# Patient Record
Sex: FEMALE | Race: WHITE | NOT HISPANIC OR LATINO | Employment: OTHER | URBAN - METROPOLITAN AREA
[De-identification: names, ages, dates, MRNs, and addresses within clinical notes are randomized per-mention and may not be internally consistent; named-entity substitution may affect disease eponyms.]

---

## 2024-03-25 ENCOUNTER — ANESTHESIA (OUTPATIENT)
Dept: SURGERY | Facility: MEDICAL CENTER | Age: 74
End: 2024-03-25
Payer: MEDICARE

## 2024-03-25 ENCOUNTER — HOSPITAL ENCOUNTER (INPATIENT)
Facility: MEDICAL CENTER | Age: 74
DRG: 746 | End: 2024-03-25
Attending: EMERGENCY MEDICINE | Admitting: SURGERY
Payer: MEDICARE

## 2024-03-25 ENCOUNTER — ANESTHESIA EVENT (OUTPATIENT)
Dept: SURGERY | Facility: MEDICAL CENTER | Age: 74
End: 2024-03-25
Payer: MEDICARE

## 2024-03-25 ENCOUNTER — HOSPITAL ENCOUNTER (OUTPATIENT)
Dept: RADIOLOGY | Facility: MEDICAL CENTER | Age: 74
End: 2024-03-25

## 2024-03-25 DIAGNOSIS — N81.10 VAGINAL PROLAPSE: ICD-10-CM

## 2024-03-25 DIAGNOSIS — E87.5 HYPERKALEMIA: ICD-10-CM

## 2024-03-25 DIAGNOSIS — K63.4 PROLAPSE OF INTESTINE: ICD-10-CM

## 2024-03-25 DIAGNOSIS — R73.9 HYPERGLYCEMIA: ICD-10-CM

## 2024-03-25 PROBLEM — E11.9 TYPE 2 DIABETES MELLITUS (HCC): Status: ACTIVE | Noted: 2024-03-25

## 2024-03-25 PROBLEM — K45.8: Status: ACTIVE | Noted: 2024-03-25

## 2024-03-25 PROBLEM — D72.829 LEUKOCYTOSIS: Status: ACTIVE | Noted: 2024-03-25

## 2024-03-25 PROBLEM — R11.2 PONV (POSTOPERATIVE NAUSEA AND VOMITING): Status: ACTIVE | Noted: 2024-03-25

## 2024-03-25 PROBLEM — I10 HYPERTENSION: Status: ACTIVE | Noted: 2024-03-25

## 2024-03-25 PROBLEM — Z98.890 PONV (POSTOPERATIVE NAUSEA AND VOMITING): Status: ACTIVE | Noted: 2024-03-25

## 2024-03-25 LAB
ABO + RH BLD: NORMAL
ABO GROUP BLD: NORMAL
ALBUMIN SERPL BCP-MCNC: 3.4 G/DL (ref 3.2–4.9)
ALBUMIN/GLOB SERPL: 1.3 G/DL
ALP SERPL-CCNC: 89 U/L (ref 30–99)
ALT SERPL-CCNC: 14 U/L (ref 2–50)
ANION GAP SERPL CALC-SCNC: 11 MMOL/L (ref 7–16)
ANION GAP SERPL CALC-SCNC: 12 MMOL/L (ref 7–16)
ANION GAP SERPL CALC-SCNC: 9 MMOL/L (ref 7–16)
AST SERPL-CCNC: 9 U/L (ref 12–45)
BASOPHILS # BLD AUTO: 0.2 % (ref 0–1.8)
BASOPHILS # BLD: 0.04 K/UL (ref 0–0.12)
BILIRUB SERPL-MCNC: 0.2 MG/DL (ref 0.1–1.5)
BLD GP AB SCN SERPL QL: NORMAL
BUN SERPL-MCNC: 29 MG/DL (ref 8–22)
BUN SERPL-MCNC: 30 MG/DL (ref 8–22)
BUN SERPL-MCNC: 30 MG/DL (ref 8–22)
CALCIUM ALBUM COR SERPL-MCNC: 8.9 MG/DL (ref 8.5–10.5)
CALCIUM SERPL-MCNC: 8.4 MG/DL (ref 8.5–10.5)
CALCIUM SERPL-MCNC: 8.5 MG/DL (ref 8.5–10.5)
CALCIUM SERPL-MCNC: 8.5 MG/DL (ref 8.5–10.5)
CHLORIDE SERPL-SCNC: 106 MMOL/L (ref 96–112)
CHLORIDE SERPL-SCNC: 109 MMOL/L (ref 96–112)
CHLORIDE SERPL-SCNC: 114 MMOL/L (ref 96–112)
CO2 SERPL-SCNC: 17 MMOL/L (ref 20–33)
CO2 SERPL-SCNC: 19 MMOL/L (ref 20–33)
CO2 SERPL-SCNC: 20 MMOL/L (ref 20–33)
CREAT SERPL-MCNC: 0.97 MG/DL (ref 0.5–1.4)
CREAT SERPL-MCNC: 1.03 MG/DL (ref 0.5–1.4)
CREAT SERPL-MCNC: 1.04 MG/DL (ref 0.5–1.4)
EKG IMPRESSION: NORMAL
EOSINOPHIL # BLD AUTO: 0 K/UL (ref 0–0.51)
EOSINOPHIL NFR BLD: 0 % (ref 0–6.9)
ERYTHROCYTE [DISTWIDTH] IN BLOOD BY AUTOMATED COUNT: 43.4 FL (ref 35.9–50)
GFR SERPLBLD CREATININE-BSD FMLA CKD-EPI: 56 ML/MIN/1.73 M 2
GFR SERPLBLD CREATININE-BSD FMLA CKD-EPI: 57 ML/MIN/1.73 M 2
GFR SERPLBLD CREATININE-BSD FMLA CKD-EPI: 61 ML/MIN/1.73 M 2
GLOBULIN SER CALC-MCNC: 2.7 G/DL (ref 1.9–3.5)
GLUCOSE BLD STRIP.AUTO-MCNC: 161 MG/DL (ref 65–99)
GLUCOSE BLD STRIP.AUTO-MCNC: 182 MG/DL (ref 65–99)
GLUCOSE BLD STRIP.AUTO-MCNC: 230 MG/DL (ref 65–99)
GLUCOSE SERPL-MCNC: 209 MG/DL (ref 65–99)
GLUCOSE SERPL-MCNC: 250 MG/DL (ref 65–99)
GLUCOSE SERPL-MCNC: 314 MG/DL (ref 65–99)
HCT VFR BLD AUTO: 43.7 % (ref 37–47)
HGB BLD-MCNC: 14.3 G/DL (ref 12–16)
IMM GRANULOCYTES # BLD AUTO: 0.1 K/UL (ref 0–0.11)
IMM GRANULOCYTES NFR BLD AUTO: 0.5 % (ref 0–0.9)
LACTATE SERPL-SCNC: 1.6 MMOL/L (ref 0.5–2)
LYMPHOCYTES # BLD AUTO: 0.64 K/UL (ref 1–4.8)
LYMPHOCYTES NFR BLD: 3.5 % (ref 22–41)
MCH RBC QN AUTO: 30.7 PG (ref 27–33)
MCHC RBC AUTO-ENTMCNC: 32.7 G/DL (ref 32.2–35.5)
MCV RBC AUTO: 93.8 FL (ref 81.4–97.8)
MONOCYTES # BLD AUTO: 0.6 K/UL (ref 0–0.85)
MONOCYTES NFR BLD AUTO: 3.3 % (ref 0–13.4)
NEUTROPHILS # BLD AUTO: 16.85 K/UL (ref 1.82–7.42)
NEUTROPHILS NFR BLD: 92.5 % (ref 44–72)
NRBC # BLD AUTO: 0 K/UL
NRBC BLD-RTO: 0 /100 WBC (ref 0–0.2)
PATHOLOGY CONSULT NOTE: NORMAL
PLATELET # BLD AUTO: 370 K/UL (ref 164–446)
PMV BLD AUTO: 10.6 FL (ref 9–12.9)
POTASSIUM SERPL-SCNC: 5.2 MMOL/L (ref 3.6–5.5)
POTASSIUM SERPL-SCNC: 5.9 MMOL/L (ref 3.6–5.5)
POTASSIUM SERPL-SCNC: 5.9 MMOL/L (ref 3.6–5.5)
PROT SERPL-MCNC: 6.1 G/DL (ref 6–8.2)
RBC # BLD AUTO: 4.66 M/UL (ref 4.2–5.4)
RH BLD: NORMAL
SODIUM SERPL-SCNC: 138 MMOL/L (ref 135–145)
SODIUM SERPL-SCNC: 139 MMOL/L (ref 135–145)
SODIUM SERPL-SCNC: 140 MMOL/L (ref 135–145)
WBC # BLD AUTO: 18.2 K/UL (ref 4.8–10.8)

## 2024-03-25 PROCEDURE — 80048 BASIC METABOLIC PNL TOTAL CA: CPT

## 2024-03-25 PROCEDURE — 0UQG0ZZ REPAIR VAGINA, OPEN APPROACH: ICD-10-PCS | Performed by: SPECIALIST

## 2024-03-25 PROCEDURE — 700105 HCHG RX REV CODE 258: Performed by: EMERGENCY MEDICINE

## 2024-03-25 PROCEDURE — 36415 COLL VENOUS BLD VENIPUNCTURE: CPT

## 2024-03-25 PROCEDURE — 93005 ELECTROCARDIOGRAM TRACING: CPT | Performed by: EMERGENCY MEDICINE

## 2024-03-25 PROCEDURE — 160048 HCHG OR STATISTICAL LEVEL 1-5: Performed by: SURGERY

## 2024-03-25 PROCEDURE — 160031 HCHG SURGERY MINUTES - 1ST 30 MINS LEVEL 5: Performed by: SURGERY

## 2024-03-25 PROCEDURE — 700105 HCHG RX REV CODE 258: Performed by: SURGERY

## 2024-03-25 PROCEDURE — 160036 HCHG PACU - EA ADDL 30 MINS PHASE I: Performed by: SURGERY

## 2024-03-25 PROCEDURE — 700111 HCHG RX REV CODE 636 W/ 250 OVERRIDE (IP): Performed by: INTERNAL MEDICINE

## 2024-03-25 PROCEDURE — 83605 ASSAY OF LACTIC ACID: CPT

## 2024-03-25 PROCEDURE — 700105 HCHG RX REV CODE 258: Performed by: INTERNAL MEDICINE

## 2024-03-25 PROCEDURE — 96372 THER/PROPH/DIAG INJ SC/IM: CPT

## 2024-03-25 PROCEDURE — 160002 HCHG RECOVERY MINUTES (STAT): Performed by: SURGERY

## 2024-03-25 PROCEDURE — 160009 HCHG ANES TIME/MIN: Performed by: SURGERY

## 2024-03-25 PROCEDURE — 99222 1ST HOSP IP/OBS MODERATE 55: CPT | Mod: 57 | Performed by: SURGERY

## 2024-03-25 PROCEDURE — 86901 BLOOD TYPING SEROLOGIC RH(D): CPT

## 2024-03-25 PROCEDURE — 86850 RBC ANTIBODY SCREEN: CPT

## 2024-03-25 PROCEDURE — 82962 GLUCOSE BLOOD TEST: CPT | Mod: 91

## 2024-03-25 PROCEDURE — 88307 TISSUE EXAM BY PATHOLOGIST: CPT

## 2024-03-25 PROCEDURE — 80053 COMPREHEN METABOLIC PANEL: CPT

## 2024-03-25 PROCEDURE — 0DT80ZZ RESECTION OF SMALL INTESTINE, OPEN APPROACH: ICD-10-PCS | Performed by: SURGERY

## 2024-03-25 PROCEDURE — 86900 BLOOD TYPING SEROLOGIC ABO: CPT

## 2024-03-25 PROCEDURE — 85025 COMPLETE CBC W/AUTO DIFF WBC: CPT

## 2024-03-25 PROCEDURE — 700102 HCHG RX REV CODE 250 W/ 637 OVERRIDE(OP): Performed by: EMERGENCY MEDICINE

## 2024-03-25 PROCEDURE — 770022 HCHG ROOM/CARE - ICU (200)

## 2024-03-25 PROCEDURE — 700111 HCHG RX REV CODE 636 W/ 250 OVERRIDE (IP): Mod: JZ,JG | Performed by: EMERGENCY MEDICINE

## 2024-03-25 PROCEDURE — 700111 HCHG RX REV CODE 636 W/ 250 OVERRIDE (IP): Mod: JZ,JG | Performed by: INTERNAL MEDICINE

## 2024-03-25 PROCEDURE — 160035 HCHG PACU - 1ST 60 MINS PHASE I: Performed by: SURGERY

## 2024-03-25 PROCEDURE — 99223 1ST HOSP IP/OBS HIGH 75: CPT | Mod: AI | Performed by: INTERNAL MEDICINE

## 2024-03-25 PROCEDURE — 96375 TX/PRO/DX INJ NEW DRUG ADDON: CPT

## 2024-03-25 PROCEDURE — 700111 HCHG RX REV CODE 636 W/ 250 OVERRIDE (IP): Performed by: ANESTHESIOLOGY

## 2024-03-25 PROCEDURE — 160042 HCHG SURGERY MINUTES - EA ADDL 1 MIN LEVEL 5: Performed by: SURGERY

## 2024-03-25 PROCEDURE — 96374 THER/PROPH/DIAG INJ IV PUSH: CPT

## 2024-03-25 PROCEDURE — 700101 HCHG RX REV CODE 250: Performed by: ANESTHESIOLOGY

## 2024-03-25 PROCEDURE — 700111 HCHG RX REV CODE 636 W/ 250 OVERRIDE (IP): Mod: JZ | Performed by: SURGERY

## 2024-03-25 PROCEDURE — 99291 CRITICAL CARE FIRST HOUR: CPT

## 2024-03-25 PROCEDURE — 700101 HCHG RX REV CODE 250: Performed by: INTERNAL MEDICINE

## 2024-03-25 PROCEDURE — C1765 ADHESION BARRIER: HCPCS | Performed by: SURGERY

## 2024-03-25 RX ORDER — POLYETHYLENE GLYCOL 3350 17 G/17G
1 POWDER, FOR SOLUTION ORAL
Status: DISCONTINUED | OUTPATIENT
Start: 2024-03-25 | End: 2024-04-02 | Stop reason: HOSPADM

## 2024-03-25 RX ORDER — HYDRALAZINE HYDROCHLORIDE 20 MG/ML
20 INJECTION INTRAMUSCULAR; INTRAVENOUS EVERY 6 HOURS PRN
Status: DISCONTINUED | OUTPATIENT
Start: 2024-03-25 | End: 2024-03-26

## 2024-03-25 RX ORDER — AMLODIPINE BESYLATE 10 MG/1
10 TABLET ORAL DAILY
COMMUNITY

## 2024-03-25 RX ORDER — LIDOCAINE HYDROCHLORIDE 20 MG/ML
INJECTION, SOLUTION EPIDURAL; INFILTRATION; INTRACAUDAL; PERINEURAL PRN
Status: DISCONTINUED | OUTPATIENT
Start: 2024-03-25 | End: 2024-03-25 | Stop reason: SURG

## 2024-03-25 RX ORDER — PHENYLEPHRINE HCL IN 0.9% NACL 0.5 MG/5ML
SYRINGE (ML) INTRAVENOUS PRN
Status: DISCONTINUED | OUTPATIENT
Start: 2024-03-25 | End: 2024-03-25 | Stop reason: SURG

## 2024-03-25 RX ORDER — DIPHENHYDRAMINE HYDROCHLORIDE 50 MG/ML
12.5 INJECTION INTRAMUSCULAR; INTRAVENOUS
Status: DISCONTINUED | OUTPATIENT
Start: 2024-03-25 | End: 2024-03-25 | Stop reason: HOSPADM

## 2024-03-25 RX ORDER — ONDANSETRON 2 MG/ML
4 INJECTION INTRAMUSCULAR; INTRAVENOUS ONCE
Status: ACTIVE | OUTPATIENT
Start: 2024-03-25 | End: 2024-03-26

## 2024-03-25 RX ORDER — OXYCODONE HYDROCHLORIDE 5 MG/1
2.5 TABLET ORAL
Status: DISCONTINUED | OUTPATIENT
Start: 2024-03-25 | End: 2024-04-02

## 2024-03-25 RX ORDER — HYDROMORPHONE HYDROCHLORIDE 1 MG/ML
0.2 INJECTION, SOLUTION INTRAMUSCULAR; INTRAVENOUS; SUBCUTANEOUS
Status: DISCONTINUED | OUTPATIENT
Start: 2024-03-25 | End: 2024-03-25 | Stop reason: HOSPADM

## 2024-03-25 RX ORDER — MIDAZOLAM HYDROCHLORIDE 1 MG/ML
INJECTION INTRAMUSCULAR; INTRAVENOUS PRN
Status: DISCONTINUED | OUTPATIENT
Start: 2024-03-25 | End: 2024-03-25 | Stop reason: SURG

## 2024-03-25 RX ORDER — ROCURONIUM BROMIDE 10 MG/ML
INJECTION, SOLUTION INTRAVENOUS PRN
Status: DISCONTINUED | OUTPATIENT
Start: 2024-03-25 | End: 2024-03-25 | Stop reason: SURG

## 2024-03-25 RX ORDER — ASCORBIC ACID 500 MG
500 TABLET ORAL DAILY
COMMUNITY

## 2024-03-25 RX ORDER — OXYCODONE HCL 5 MG/5 ML
5 SOLUTION, ORAL ORAL
Status: DISCONTINUED | OUTPATIENT
Start: 2024-03-25 | End: 2024-03-25 | Stop reason: HOSPADM

## 2024-03-25 RX ORDER — EPHEDRINE SULFATE 50 MG/ML
5 INJECTION, SOLUTION INTRAVENOUS
Status: DISCONTINUED | OUTPATIENT
Start: 2024-03-25 | End: 2024-03-25 | Stop reason: HOSPADM

## 2024-03-25 RX ORDER — CALCIUM CHLORIDE 100 MG/ML
INJECTION INTRAVENOUS; INTRAVENTRICULAR PRN
Status: DISCONTINUED | OUTPATIENT
Start: 2024-03-25 | End: 2024-03-25 | Stop reason: SURG

## 2024-03-25 RX ORDER — HYDRALAZINE HYDROCHLORIDE 20 MG/ML
5 INJECTION INTRAMUSCULAR; INTRAVENOUS
Status: DISCONTINUED | OUTPATIENT
Start: 2024-03-25 | End: 2024-03-25 | Stop reason: HOSPADM

## 2024-03-25 RX ORDER — HALOPERIDOL 5 MG/ML
1 INJECTION INTRAMUSCULAR
Status: DISCONTINUED | OUTPATIENT
Start: 2024-03-25 | End: 2024-03-25 | Stop reason: HOSPADM

## 2024-03-25 RX ORDER — ENOXAPARIN SODIUM 100 MG/ML
40 INJECTION SUBCUTANEOUS DAILY
Status: DISCONTINUED | OUTPATIENT
Start: 2024-03-26 | End: 2024-04-02 | Stop reason: HOSPADM

## 2024-03-25 RX ORDER — ONDANSETRON 2 MG/ML
4 INJECTION INTRAMUSCULAR; INTRAVENOUS
Status: DISCONTINUED | OUTPATIENT
Start: 2024-03-25 | End: 2024-03-25 | Stop reason: HOSPADM

## 2024-03-25 RX ORDER — LOVASTATIN 40 MG/1
40 TABLET ORAL DAILY
COMMUNITY

## 2024-03-25 RX ORDER — AMOXICILLIN 250 MG
2 CAPSULE ORAL EVERY EVENING
Status: DISCONTINUED | OUTPATIENT
Start: 2024-03-25 | End: 2024-04-02 | Stop reason: HOSPADM

## 2024-03-25 RX ORDER — OXYCODONE HYDROCHLORIDE 5 MG/1
5 TABLET ORAL
Status: DISCONTINUED | OUTPATIENT
Start: 2024-03-25 | End: 2024-04-02

## 2024-03-25 RX ORDER — IBUPROFEN 200 MG
400 TABLET ORAL NIGHTLY
COMMUNITY

## 2024-03-25 RX ORDER — HYDRALAZINE HYDROCHLORIDE 20 MG/ML
10 INJECTION INTRAMUSCULAR; INTRAVENOUS ONCE
Status: ACTIVE | OUTPATIENT
Start: 2024-03-25 | End: 2024-03-26

## 2024-03-25 RX ORDER — HYDROMORPHONE HYDROCHLORIDE 2 MG/ML
INJECTION, SOLUTION INTRAMUSCULAR; INTRAVENOUS; SUBCUTANEOUS PRN
Status: DISCONTINUED | OUTPATIENT
Start: 2024-03-25 | End: 2024-03-25 | Stop reason: SURG

## 2024-03-25 RX ORDER — ATENOLOL 25 MG/1
25 TABLET ORAL DAILY
COMMUNITY

## 2024-03-25 RX ORDER — BENAZEPRIL HYDROCHLORIDE 40 MG/1
40 TABLET ORAL DAILY
COMMUNITY

## 2024-03-25 RX ORDER — HYDROMORPHONE HYDROCHLORIDE 1 MG/ML
0.4 INJECTION, SOLUTION INTRAMUSCULAR; INTRAVENOUS; SUBCUTANEOUS
Status: DISCONTINUED | OUTPATIENT
Start: 2024-03-25 | End: 2024-03-25 | Stop reason: HOSPADM

## 2024-03-25 RX ORDER — LABETALOL HYDROCHLORIDE 5 MG/ML
5 INJECTION, SOLUTION INTRAVENOUS
Status: DISCONTINUED | OUTPATIENT
Start: 2024-03-25 | End: 2024-03-25 | Stop reason: HOSPADM

## 2024-03-25 RX ORDER — OXYCODONE HCL 5 MG/5 ML
10 SOLUTION, ORAL ORAL
Status: DISCONTINUED | OUTPATIENT
Start: 2024-03-25 | End: 2024-03-25 | Stop reason: HOSPADM

## 2024-03-25 RX ORDER — HYDROMORPHONE HYDROCHLORIDE 1 MG/ML
0.1 INJECTION, SOLUTION INTRAMUSCULAR; INTRAVENOUS; SUBCUTANEOUS
Status: DISCONTINUED | OUTPATIENT
Start: 2024-03-25 | End: 2024-03-25 | Stop reason: HOSPADM

## 2024-03-25 RX ORDER — HYDROMORPHONE HYDROCHLORIDE 1 MG/ML
0.5 INJECTION, SOLUTION INTRAMUSCULAR; INTRAVENOUS; SUBCUTANEOUS ONCE
Status: ACTIVE | OUTPATIENT
Start: 2024-03-25 | End: 2024-03-26

## 2024-03-25 RX ORDER — METRONIDAZOLE 500 MG/100ML
500 INJECTION, SOLUTION INTRAVENOUS EVERY 12 HOURS
Status: DISCONTINUED | OUTPATIENT
Start: 2024-03-25 | End: 2024-03-25

## 2024-03-25 RX ORDER — MORPHINE SULFATE 4 MG/ML
2 INJECTION INTRAVENOUS
Status: DISCONTINUED | OUTPATIENT
Start: 2024-03-25 | End: 2024-03-29

## 2024-03-25 RX ORDER — ONDANSETRON 4 MG/1
4 TABLET, ORALLY DISINTEGRATING ORAL EVERY 4 HOURS PRN
Status: DISCONTINUED | OUTPATIENT
Start: 2024-03-25 | End: 2024-04-02 | Stop reason: HOSPADM

## 2024-03-25 RX ORDER — ONDANSETRON 2 MG/ML
INJECTION INTRAMUSCULAR; INTRAVENOUS PRN
Status: DISCONTINUED | OUTPATIENT
Start: 2024-03-25 | End: 2024-03-25 | Stop reason: SURG

## 2024-03-25 RX ORDER — SODIUM CHLORIDE 9 MG/ML
1000 INJECTION, SOLUTION INTRAVENOUS ONCE
Status: COMPLETED | OUTPATIENT
Start: 2024-03-25 | End: 2024-03-25

## 2024-03-25 RX ORDER — EPHEDRINE SULFATE 50 MG/ML
INJECTION, SOLUTION INTRAVENOUS PRN
Status: DISCONTINUED | OUTPATIENT
Start: 2024-03-25 | End: 2024-03-25 | Stop reason: SURG

## 2024-03-25 RX ORDER — SODIUM CHLORIDE, SODIUM LACTATE, POTASSIUM CHLORIDE, CALCIUM CHLORIDE 600; 310; 30; 20 MG/100ML; MG/100ML; MG/100ML; MG/100ML
INJECTION, SOLUTION INTRAVENOUS CONTINUOUS
Status: DISCONTINUED | OUTPATIENT
Start: 2024-03-25 | End: 2024-03-25 | Stop reason: HOSPADM

## 2024-03-25 RX ORDER — LABETALOL HYDROCHLORIDE 5 MG/ML
10 INJECTION, SOLUTION INTRAVENOUS EVERY 4 HOURS PRN
Status: DISCONTINUED | OUTPATIENT
Start: 2024-03-25 | End: 2024-03-26

## 2024-03-25 RX ORDER — SODIUM CHLORIDE 9 MG/ML
INJECTION, SOLUTION INTRAVENOUS CONTINUOUS
Status: DISCONTINUED | OUTPATIENT
Start: 2024-03-25 | End: 2024-03-29

## 2024-03-25 RX ORDER — MORPHINE SULFATE 4 MG/ML
4 INJECTION INTRAVENOUS
Status: COMPLETED | OUTPATIENT
Start: 2024-03-25 | End: 2024-03-25

## 2024-03-25 RX ORDER — ATENOLOL 25 MG/1
25 TABLET ORAL
Status: DISCONTINUED | OUTPATIENT
Start: 2024-03-25 | End: 2024-03-25

## 2024-03-25 RX ORDER — ONDANSETRON 2 MG/ML
4 INJECTION INTRAMUSCULAR; INTRAVENOUS EVERY 4 HOURS PRN
Status: DISCONTINUED | OUTPATIENT
Start: 2024-03-25 | End: 2024-04-02 | Stop reason: HOSPADM

## 2024-03-25 RX ADMIN — HYDROMORPHONE HYDROCHLORIDE 0.6 MG: 2 INJECTION INTRAMUSCULAR; INTRAVENOUS; SUBCUTANEOUS at 09:23

## 2024-03-25 RX ADMIN — MORPHINE SULFATE 2 MG: 4 INJECTION, SOLUTION INTRAMUSCULAR; INTRAVENOUS at 18:16

## 2024-03-25 RX ADMIN — CALCIUM CHLORIDE 300 MG: 100 INJECTION, SOLUTION INTRAVENOUS; INTRAVENTRICULAR at 09:00

## 2024-03-25 RX ADMIN — SODIUM CHLORIDE 1000 ML: 9 INJECTION, SOLUTION INTRAVENOUS at 03:25

## 2024-03-25 RX ADMIN — Medication 200 MCG: at 07:58

## 2024-03-25 RX ADMIN — Medication 25 MG: at 08:22

## 2024-03-25 RX ADMIN — CEFTRIAXONE SODIUM 1000 MG: 10 INJECTION, POWDER, FOR SOLUTION INTRAVENOUS at 07:56

## 2024-03-25 RX ADMIN — FENTANYL CITRATE 50 MCG: 50 INJECTION, SOLUTION INTRAMUSCULAR; INTRAVENOUS at 07:51

## 2024-03-25 RX ADMIN — SUGAMMADEX 100 MG: 100 INJECTION, SOLUTION INTRAVENOUS at 09:25

## 2024-03-25 RX ADMIN — MIDAZOLAM HYDROCHLORIDE 1 MG: 1 INJECTION, SOLUTION INTRAMUSCULAR; INTRAVENOUS at 07:51

## 2024-03-25 RX ADMIN — ONDANSETRON 4 MG: 2 INJECTION INTRAMUSCULAR; INTRAVENOUS at 05:13

## 2024-03-25 RX ADMIN — Medication 100 MCG: at 07:56

## 2024-03-25 RX ADMIN — FENTANYL CITRATE 50 MCG: 50 INJECTION, SOLUTION INTRAMUSCULAR; INTRAVENOUS at 08:22

## 2024-03-25 RX ADMIN — CALCIUM CHLORIDE 400 MG: 100 INJECTION, SOLUTION INTRAVENOUS; INTRAVENTRICULAR at 09:38

## 2024-03-25 RX ADMIN — INSULIN HUMAN 2 UNITS: 100 INJECTION, SOLUTION PARENTERAL at 12:12

## 2024-03-25 RX ADMIN — ROCURONIUM BROMIDE 90 MG: 50 INJECTION, SOLUTION INTRAVENOUS at 07:51

## 2024-03-25 RX ADMIN — Medication 100 MCG: at 08:40

## 2024-03-25 RX ADMIN — SODIUM CHLORIDE: 9 INJECTION, SOLUTION INTRAVENOUS at 08:38

## 2024-03-25 RX ADMIN — INSULIN HUMAN 2 UNITS: 100 INJECTION, SOLUTION PARENTERAL at 17:54

## 2024-03-25 RX ADMIN — SODIUM CHLORIDE: 9 INJECTION, SOLUTION INTRAVENOUS at 07:46

## 2024-03-25 RX ADMIN — SODIUM CHLORIDE: 9 INJECTION, SOLUTION INTRAVENOUS at 23:15

## 2024-03-25 RX ADMIN — EPHEDRINE SULFATE 5 MG: 50 INJECTION, SOLUTION INTRAVENOUS at 08:14

## 2024-03-25 RX ADMIN — INSULIN HUMAN 5 UNITS: 100 INJECTION, SOLUTION PARENTERAL at 04:07

## 2024-03-25 RX ADMIN — EPHEDRINE SULFATE 5 MG: 50 INJECTION, SOLUTION INTRAVENOUS at 08:06

## 2024-03-25 RX ADMIN — MICAFUNGIN SODIUM 100 MG: 100 INJECTION, POWDER, LYOPHILIZED, FOR SOLUTION INTRAVENOUS at 13:27

## 2024-03-25 RX ADMIN — LIDOCAINE HYDROCHLORIDE 100 MG: 20 INJECTION, SOLUTION EPIDURAL; INFILTRATION; INTRACAUDAL at 07:51

## 2024-03-25 RX ADMIN — FENTANYL CITRATE 25 MCG: 50 INJECTION, SOLUTION INTRAMUSCULAR; INTRAVENOUS at 09:33

## 2024-03-25 RX ADMIN — PIPERACILLIN AND TAZOBACTAM 4.5 G: 4; .5 INJECTION, POWDER, FOR SOLUTION INTRAVENOUS at 21:16

## 2024-03-25 RX ADMIN — Medication 100 MCG: at 08:43

## 2024-03-25 RX ADMIN — PIPERACILLIN AND TAZOBACTAM 4.5 G: 4; .5 INJECTION, POWDER, FOR SOLUTION INTRAVENOUS at 15:11

## 2024-03-25 RX ADMIN — SODIUM CHLORIDE: 9 INJECTION, SOLUTION INTRAVENOUS at 12:11

## 2024-03-25 RX ADMIN — CALCIUM CHLORIDE 300 MG: 100 INJECTION, SOLUTION INTRAVENOUS; INTRAVENTRICULAR at 07:56

## 2024-03-25 RX ADMIN — Medication 100 MCG: at 08:35

## 2024-03-25 RX ADMIN — EPHEDRINE SULFATE 10 MG: 50 INJECTION, SOLUTION INTRAVENOUS at 08:38

## 2024-03-25 RX ADMIN — Medication 100 MCG: at 08:38

## 2024-03-25 RX ADMIN — SUGAMMADEX 200 MG: 100 INJECTION, SOLUTION INTRAVENOUS at 09:23

## 2024-03-25 RX ADMIN — MORPHINE SULFATE 4 MG: 4 INJECTION, SOLUTION INTRAMUSCULAR; INTRAVENOUS at 05:14

## 2024-03-25 RX ADMIN — PROPOFOL 150 MG: 10 INJECTION, EMULSION INTRAVENOUS at 07:51

## 2024-03-25 RX ADMIN — FENTANYL CITRATE 25 MCG: 50 INJECTION, SOLUTION INTRAMUSCULAR; INTRAVENOUS at 09:28

## 2024-03-25 RX ADMIN — MORPHINE SULFATE 2 MG: 4 INJECTION, SOLUTION INTRAMUSCULAR; INTRAVENOUS at 15:16

## 2024-03-25 RX ADMIN — Medication 100 MCG: at 08:45

## 2024-03-25 RX ADMIN — HYDROMORPHONE HYDROCHLORIDE 0.4 MG: 2 INJECTION INTRAMUSCULAR; INTRAVENOUS; SUBCUTANEOUS at 09:30

## 2024-03-25 RX ADMIN — ONDANSETRON 4 MG: 2 INJECTION INTRAMUSCULAR; INTRAVENOUS at 09:20

## 2024-03-25 RX ADMIN — PIPERACILLIN AND TAZOBACTAM 4.5 G: 4; .5 INJECTION, POWDER, FOR SOLUTION INTRAVENOUS at 13:04

## 2024-03-25 ASSESSMENT — PAIN DESCRIPTION - PAIN TYPE
TYPE: ACUTE PAIN
TYPE: ACUTE PAIN
TYPE: SURGICAL PAIN
TYPE: ACUTE PAIN
TYPE: SURGICAL PAIN
TYPE: SURGICAL PAIN
TYPE: ACUTE PAIN

## 2024-03-25 ASSESSMENT — ENCOUNTER SYMPTOMS
SPEECH CHANGE: 0
HALLUCINATIONS: 0
HEADACHES: 0
HEARTBURN: 0
ORTHOPNEA: 0
FEVER: 0
FOCAL WEAKNESS: 0
HEMOPTYSIS: 0
ABDOMINAL PAIN: 1
COUGH: 0
NAUSEA: 1
SPUTUM PRODUCTION: 0
PHOTOPHOBIA: 0
DOUBLE VISION: 0
WEIGHT LOSS: 0
CHILLS: 0
PALPITATIONS: 0
TREMORS: 0
VOMITING: 0
NERVOUS/ANXIOUS: 0
BACK PAIN: 0
FLANK PAIN: 0
BRUISES/BLEEDS EASILY: 0
BLURRED VISION: 0
NECK PAIN: 0
POLYDIPSIA: 0

## 2024-03-25 ASSESSMENT — PATIENT HEALTH QUESTIONNAIRE - PHQ9
1. LITTLE INTEREST OR PLEASURE IN DOING THINGS: NOT AT ALL
SUM OF ALL RESPONSES TO PHQ9 QUESTIONS 1 AND 2: 0
2. FEELING DOWN, DEPRESSED, IRRITABLE, OR HOPELESS: NOT AT ALL

## 2024-03-25 ASSESSMENT — FIBROSIS 4 INDEX: FIB4 SCORE: 0.49

## 2024-03-25 ASSESSMENT — LIFESTYLE VARIABLES: SUBSTANCE_ABUSE: 0

## 2024-03-25 NOTE — ASSESSMENT & PLAN NOTE
WBC 18.2  Likely related to small bowel prolapse.  Will continue empiric antibiotics for possible bacterial translocation

## 2024-03-25 NOTE — OR SURGEON
Immediate Post OP Note    PreOp Diagnosis: Vaginal cuff dehiscence with evisceration of small bowel      PostOp Diagnosis: Same as above      Procedure(s):  LAPAROTOMY, EXPLORATORY, CELIOTOMY  REPAIR, vaginal cuff dehiscence, closure of the vaginal cuff      Surgeon(s):  JULIA Su M.D. Frieda M Hulka, M.D. Peter C Lim, M.D.    Anesthesiologist/Type of Anesthesia:  Anesthesiologist: Veronique Pires M.D./* No anesthesia type entered *    Surgical Staff:  Circulator: Honey Foster R.N.  Relief Circulator: Carolyn Marquez R.N.  Scrub Person: Ryane Murillo Assist: Rosaura Landa D.N.P.    Specimens removed if any:  ID Type Source Tests Collected by Time Destination   A :  Tissue Small intestine PATHOLOGY SPECIMEN Lorrie Harris M.D. 3/25/2024  8:59 AM        Estimated Blood Loss: Minimal    Findings: Complete evisceration of the small bowel through the vaginal canal approximately 3 feet of dusky small intestines noted there appears to be no necrosis of the bowel.  Uterus is absent.  Cannot appreciate any evidence of adnexa.  Patient also has a cystocele.  She has apical prolapse.    Complications: None        3/25/2024 9:00 AM Delio Boss M.D.

## 2024-03-25 NOTE — H&P
"Hospital Medicine History & Physical Note    Date of Service  3/25/2024    Primary Care Physician  Pcp Pt States None        Code Status  Full Code    Chief Complaint  Chief Complaint   Patient presents with    Sent by MD     Pt transfer from [place] for prolapsed bowel. Pt states she went to [facility] [place] this morning around 9 am, she felt like her \"water broke.\" Bowel is protruding out of vagina.        History of Presenting Illness  Katie Hollingsworth is a 74 y.o. female with a past medical history of hysterectomy, type 2 diabetes, hypertension who presented 3/25/2024 as a transfer from a hospital in [facility] with prolapsed bowel.  Patient was taking a shower when she felt like \"water broke\" and to noticed bowels protruding through perineal vagina.  CT was abdomen at outside facility showed large rectal versus vaginal prolapse containing multiple loops of small bowel and mesenteric fat with perineal defect measuring 3.7 x 3.5 cm.  Infrarenal abdominal aortic aneurysm 2.8 cm.  Blood pressure noted to be elevated at 190/90, mild bradycardia 55.  Blood work showed WBC 18.2, potassium 5.9, blood sugar 314.  Patient was given 5 units of insulin subcu for hyperkalemia.  EKG shows sinus rhythm, no peaked T waves.  ERP Dr. Augustine consulted with /OB/GYN.  Surgical plan is pending.    Patient complains of nausea and lower abdominal pain that started after onset of prolapse.  I discussed the plan of care with patient and ERP .    Review of Systems  Review of Systems   Constitutional:  Negative for chills, fever and weight loss.   HENT:  Negative for ear pain, hearing loss and tinnitus.    Eyes:  Negative for blurred vision, double vision and photophobia.   Respiratory:  Negative for cough, hemoptysis and sputum production.    Cardiovascular:  Negative for chest pain, palpitations and orthopnea.   Gastrointestinal:  Positive for abdominal pain and nausea. Negative for heartburn and vomiting.   Genitourinary:  Negative " for dysuria, flank pain, frequency and hematuria.   Musculoskeletal:  Negative for back pain, joint pain and neck pain.   Skin:  Negative for itching and rash.   Neurological:  Negative for tremors, speech change, focal weakness and headaches.   Endo/Heme/Allergies:  Negative for environmental allergies and polydipsia. Does not bruise/bleed easily.   Psychiatric/Behavioral:  Negative for hallucinations and substance abuse. The patient is not nervous/anxious.        Past Medical History   has no past medical history on file.    Surgical History   has no past surgical history on file.     Family History  family history is not on file.   Family history reviewed with patient. There is no family history that is pertinent to the chief complaint.     Social History   reports that she has never smoked. She has never used smokeless tobacco. She reports that she does not drink alcohol and does not use drugs.    Allergies  No Known Allergies    Medications  Prior to Admission Medications   Prescriptions Last Dose Informant Patient Reported? Taking?   AMLODIPINE BESYLATE PO 3/23/2024 at PM Patient Yes Yes   Sig: Take 1 Tablet by mouth every day.   ATENOLOL PO 3/23/2024 at PM Patient Yes Yes   Sig: Take 1 Tablet by mouth every day.   ascorbic acid (VITAMIN C) 500 MG tablet 3/24/2024 at AM Patient Yes Yes   Sig: Take 500 mg by mouth every day.   benazepril (LOTENSIN) 40 MG tablet 3/24/2024 at AM Patient Yes Yes   Sig: Take 40 mg by mouth every day.   ibuprofen (MOTRIN) 200 MG Tab 3/23/2024 at PM Patient Yes Yes   Sig: Take 400 mg by mouth every evening.   lovastatin (MEVACOR) 40 MG tablet 3/24/2024 at AM Patient Yes Yes   Sig: Take 40 mg by mouth every day.   metFORMIN (GLUCOPHAGE) 500 MG Tab 3/24/2024 at AM Patient Yes Yes   Sig: Take 1,000 mg by mouth 2 times a day.      Facility-Administered Medications: None       Physical Exam  Temp:  [36.3 °C (97.4 °F)] 36.3 °C (97.4 °F)  Pulse:  [52-70] 52  Resp:  [12-16] 16  BP:  "(138-145)/(65-67) 145/67  SpO2:  [89 %-97 %] 97 %  Blood Pressure : (!) 145/67   Temperature: 36.3 °C (97.4 °F)   Pulse: (!) 52   Respiration: 16   Pulse Oximetry: 97 %       Physical Exam  Vitals and nursing note reviewed.   Constitutional:       General: She is not in acute distress.     Appearance: Normal appearance.   HENT:      Head: Normocephalic and atraumatic.      Nose: Nose normal.      Mouth/Throat:      Mouth: Mucous membranes are moist.   Eyes:      Extraocular Movements: Extraocular movements intact.      Pupils: Pupils are equal, round, and reactive to light.   Cardiovascular:      Rate and Rhythm: Regular rhythm. Bradycardia present.   Pulmonary:      Effort: Pulmonary effort is normal.      Breath sounds: Normal breath sounds.   Abdominal:      General: Abdomen is flat. There is no distension.      Tenderness: There is no abdominal tenderness.   Genitourinary:     Comments: Vaginal prolapse  Musculoskeletal:         General: No swelling or deformity. Normal range of motion.      Cervical back: Normal range of motion and neck supple.   Skin:     General: Skin is warm and dry.   Neurological:      General: No focal deficit present.      Mental Status: She is alert and oriented to person, place, and time.   Psychiatric:         Mood and Affect: Mood normal.         Behavior: Behavior normal.         Laboratory:  Recent Labs     03/25/24  0307   WBC 18.2*   RBC 4.66   HEMOGLOBIN 14.3   HEMATOCRIT 43.7   MCV 93.8   MCH 30.7   MCHC 32.7   RDW 43.4   PLATELETCT 370   MPV 10.6     Recent Labs     03/25/24  0307   SODIUM 138   POTASSIUM 5.9*   CHLORIDE 106   CO2 20   GLUCOSE 314*   BUN 30*   CREATININE 1.04   CALCIUM 8.4*     Recent Labs     03/25/24  0307   ALTSGPT 14   ASTSGOT 9*   ALKPHOSPHAT 89   TBILIRUBIN 0.2   GLUCOSE 314*         No results for input(s): \"NTPROBNP\" in the last 72 hours.      No results for input(s): \"TROPONINT\" in the last 72 hours.    Imaging:  OUTSIDE IMAGES-CT ABDOMEN /PELVIS "   Final Result              Assessment/Plan:  Justification for Admission Status  I anticipate this patient will require at least two midnights for appropriate medical management, necessitating inpatient admission because of vaginal prolapse    Patient will need a Med/Surg bed on MEDICAL service .  The need is secondary to vaginal prolapse.    * Vaginal prolapse- (present on admission)  Assessment & Plan  OB/GYN consulted, plan is pending  IV morphine as needed for pain  N.p.o.    Hypertension  Assessment & Plan  Uncontrolled   We will hold benazepril due to hyperkalemia, hold atenolol due to bradycardia will hold amlodipine due to strict n.p.o.  IV hydralazine as needed    Leukocytosis  Assessment & Plan  WBC 18.2  Likely related to small bowel prolapse.  Will continue empiric antibiotics for possible bacterial translocation    Type 2 diabetes mellitus (HCC)  Assessment & Plan  Uncontrolled, with hyperglycemia  Blood sugar 314  Insulin sliding scale with fingerstick every 6 hours while NPO    Hyperkalemia  Assessment & Plan  5.9.  No EKG Changes.  Will hold ACE inhibitor  Given subcu insulin 5 units  Repeat BMP at 6 AM          VTE prophylaxis: enoxaparin ppx

## 2024-03-25 NOTE — ED NOTES
Med rec complete per patient  Allergies reviewed.   Outpatient antibiotics in the last 30 days? No   Anticoagulants taken in the last 14 days? No    Pharmacy patient utilizes: [facility] in [place] ([phone])     Sammi Chapin, ROSALINDAhT

## 2024-03-25 NOTE — OP REPORT
DATE OF OPERATION:  3/25/2024    PREOPERATIVE DIAGNOSIS:  Vaginal Evisceration/Herniation of small bowel     POSTOPERATIVE DIAGNOSIS: Same    PROCEDURE PERFORMED: Exploratory Laparotomy, Reduction of herniated bowel, Closure of vaginal cuff (Dr Boss), Bowel Resection with anastomosis    SURGEON:    Lorrie Harris M.D.    ASSISTANT:    Edu Dumont M.D.    ANESTHESIOLOGIST:  Veronique Pires M.D.    ANESTHESIA:   General endotracheal anesthesia.    ASA CLASSIFICATION:  III. Emergent.    INDICATIONS: The patient is a 74 year-old woman with an evisceration of bowel through her vagina. She is taken to the operating room for reduction of hernia and bowel resection.    The complexity of the surgical procedure necessitated additional skilled operative assistance from another surgeon. The assistant was present during the entire operation. The surgical assistant performed the following: provided assistance with optimal surgical exposure of the operative field, provided high complexity, subspecialty decision making input, and assisted with the surgical resection and reconstruction.    FINDINGS: Approximately two feet of small bowel herniated through the vagina.     WOUND CLASSIFICATION:  Class IV, Dirty or Infected. Infection present at the time of surgery (PATOS).    SPECIMEN:     Small bowel.    ESTIMATED BLOOD LOSS:  100 mL.    PROCEDURE: Following informed consent consent, the patient was properly identified, taken to the operating room and placed in supine position where a general endotracheal anesthesia was administered. Intravenous antibiotics were administered by the anesthesiologist in correct time interval. The patient arrived with a previously placed Gibbs catheter. Sequential compression devices were employed. The patient was placed in a low lithotomy position with careful attention to padding and support of the extremities. A safety retension strap was secured. Active patient warming devices were  utilized. The operative site was widely prepped and draped into a sterile field.  A timeout was conducted with the full attention and participation of all operating room personnel.      A 10 blade scalpel was used to make a midline laparotomy incision.  Electrocautery was used to dissect down to the fascia and to the patient's abdomen.  The abdomen was opened widely.  There was a very large section of small bowel herniated down through the patient's old vaginal cuff.  She has a history of a remote hysterectomy years and years prior.  The bowel was reduced through the vaginal defect.  Once it was pulled back into the abdomen it was noted to be very thickened and the mesentery appeared infarcted.  Dr. Boss had joined us in the case at this point and evaluated the old vaginal cuff site.  He closed the defect as described in a separate op note.  Once this was closed copious amount of normal saline was used to washout the patient's abdomen.    The small bowel was reevaluated and continued to appear thickened with potentially infarcted mesentery.  For this reason we completed a bowel resection.  The bowel was transected with a LEWIS stapler with proximal and distal to the questionable portion.  A LEWIS stapler was then used to complete the anastomosis.  LigaSure device was used to transect the mesentery.  A 3-0 Vicryl runner was used to close the mesenteric defect.  Bowel resection appeared viable.  The abdominal contents were returned to the normal anatomic position with interposition of Seprafilm. The fascia was approximated with with a pair of running 0 VICRYL® Plus Antibacterial sutures. The skin was approximated with interrupted staples.     The patient tolerated the procedure well, and there were no apparent complications. All sponge, needle, and instrument counts were correct on 2 separate occasions. The patient was was awakened, extubated, and transferred to  to the post anesthesia care unit (PACU) in satisfactory  condition.       ____________________________________     Lorrie Harris M.D.    DD: 3/25/2024  10:36 AM

## 2024-03-25 NOTE — ED NOTES
Pt being taken to pre-op at this time by transport team via gurney. Pt is awake and alert, talking to staff, in no apparent distress at time of transfer. Pt's paperwork and belongings sent upstairs with pt.

## 2024-03-25 NOTE — ED NOTES
Called lab, the repeat BMP from 0553 did not make it to lab. New repeat will need to be collected.

## 2024-03-25 NOTE — ED TRIAGE NOTES
"Katie Edel   74 y.o.     Chief Complaint   Patient presents with    Sent by MD     Pt transfer from [place] for prolapsed bowel. Pt states she went to [facility]  this morning around 9 am, she felt like her \"water broke.\" Bowel is protruding out of vagina.      A&O x 4. Pt states 10/10 pain. ERP at bedside    EMS interventions: Ativan en route,      Vitals:    03/25/24 0300   BP: 138/65   Pulse: 65   Resp: 16   Temp: 36.3 °C (97.4 °F)   SpO2: 96%       "

## 2024-03-25 NOTE — ED NOTES
Report to RN for [id number], transport team delayed due to pt being evaluated by another doctor in the ED per OBGYN.

## 2024-03-25 NOTE — PROGRESS NOTES
Pt arrives from OR to PACU at 0943. Pt identification verified by team, pt placed on all monitors with alarms audible, report and care of pt received from Anesthesiologist and RN. Assessment completed, pt changed into hospital gown and provided with warm blankets.      Patient to floor with RN in stable condition. VSS. Surgical dressings clean dry intact. Aox4 and on 2 l O2. Belongings returned. Family updated. No further needs.

## 2024-03-25 NOTE — ASSESSMENT & PLAN NOTE
Hx of hysterectomy 40 years ago.  Had BM and felt bowel come out 3/24 AM.  Seen in [place] and transferred to [place].  Large volume evisceration of small bowel coming through perineum upon arrival.  3/25 Exploratory celiotomy & repair of vaginal cuff dehiscence.  Dr. Boss, gynecology oncology.  [facility].

## 2024-03-25 NOTE — ASSESSMENT & PLAN NOTE
Uncontrolled, with hyperglycemia  Blood sugar 314  Insulin sliding scale with fingerstick every 6 hours while NPO

## 2024-03-25 NOTE — OP REPORT
PreOp Diagnosis: Vaginal cuff dehiscence with evisceration of small bowel        PostOp Diagnosis: Same as above        Procedure(s):  LAPAROTOMY, EXPLORATORY, CELIOTOMY  REPAIR, vaginal cuff dehiscence, closure of the vaginal cuff        Surgeon(s):  JULIA Su M.D. Frieda M Hulka, M.D. Peter C Lim, M.D.     Anesthesiologist/Type of Anesthesia:  Anesthesiologist: Veronique Pires M.D./* No anesthesia type entered *     Surgical Staff:  Circulator: Honey Foster R.N.  Relief Circulator: Carolyn Marquez R.N.  Scrub Person: Rayne Murillo Assist: BETTY GaloPCorrie     Specimens removed if any:  ID Type Source Tests Collected by Time Destination   A :  Tissue Small intestine PATHOLOGY SPECIMEN Lorrie Harris M.D. 3/25/2024  8:59 AM           Estimated Blood Loss: Minimal     Findings: Complete evisceration of the small bowel through the vaginal canal approximately 3 feet of dusky small intestines noted there appears to be no necrosis of the bowel.  Uterus is absent.  Cannot appreciate any evidence of adnexa.  Patient also has a cystocele.  She has apical prolapse.     Complications: None    Indication:    contaminated wound was not recommended to place a mesh today.  After noting this I help reduce the bowel back into the peritoneal cavity and subsequently sold the vaginal cuff while the surgical team proceeded on with her bowel resection.      Procedure: Upon my arrival patient was already intubated and celiotomy had already been performed by the general surgical team.  When I noted it was bowel evisceration from vaginal canal.  The bowel looks somewhat still pink.  We then wet the bowel and easily reduced it and then pushed back into the peritoneal cavity.  After completion of this I was unable to explore the vaginal canal.  Patient had an atrophic vagina complete pelvic floor relaxation.  I felt that she she will require a abdominal sacrocolpopexy however again as mentioned above this was considered a clean contaminated wound.  I did not want to subject the mesh to potential infection thus we did not like to proceed with the abdominal sacrocolpopexy.  Instead I help close the vaginal cuff which was closed with a figure-of-eight interrupted 0 Vicryl suture.  After completion of this I then left the operating room and the remainder of the procedure was completed by Dr. Harris.

## 2024-03-25 NOTE — ASSESSMENT & PLAN NOTE
Uncontrolled   We will hold benazepril due to hyperkalemia, hold atenolol due to bradycardia will hold amlodipine due to strict n.p.o.  IV hydralazine as needed

## 2024-03-25 NOTE — ANESTHESIA PREPROCEDURE EVALUATION
Case: [id number] Date/Time: 03/25/24 0715    Procedures:       LAPAROTOMY, EXPLORATORY, CELIOTOMY      REPAIR, HERNIA, INCISIONAL    Location: [facility] OR 07 / SURGERY [facility]    Surgeons: Lorrie Harris M.D.            Relevant Problems   ANESTHESIA   (positive) PONV (postoperative nausea and vomiting)      PULMONARY (within normal limits)      NEURO   (negative) CVA (cerebral vascular accident) (HCC)   (negative) Neuromuscular disease (HCC)   (negative) Seizures (HCC)   (negative) TIA (transient ischemic attack)      CARDIAC   (positive) Hypertension      ENDO   (positive) Type 2 diabetes mellitus (HCC)      Other   (positive) Hyperkalemia   (positive) Perineal hernia       Physical Exam    Airway   Mallampati: II  TM distance: >3 FB  Neck ROM: full       Cardiovascular - normal exam  Rhythm: regular  Rate: normal  (-) murmur     Dental - normal exam           Pulmonary - normal exam  Breath sounds clear to auscultation     Abdominal    Neurological - normal exam                   Anesthesia Plan    ASA 3- EMERGENT   ASA physical status emergent criteria: compromised vital organ, limb or tissue    Plan - general       Airway plan will be ETT    (Repeat potassium 5.9.  No concerning signs on EKG.  Discussed with surgeon who would like to proceed given emergent nature of surgery.)      Induction: intravenous    Postoperative Plan: Postoperative administration of opioids is intended.    Pertinent diagnostic labs and testing reviewed    Informed Consent:    Anesthetic plan and risks discussed with patient.    Use of blood products discussed with: patient whom consented to blood products.

## 2024-03-25 NOTE — CONSULTS
DATE OF CONSULTATION:  3/25/2024     REFERRING PHYSICIAN:   Eryn Oden M.D.     CONSULTING PHYSICIAN:  Arti Aguiar M.D.     REASON FOR CONSULTATION:  I have been asked by Dr. Oden to see the patient in surgical consultation for evaluation of perineal hernia.    HISTORY OF PRESENT ILLNESS: The patient is a 74 year-old White woman who presents to the Emergency Department with a 1-day history of a perineal hernia. She was having a bowel movement and felt intestine come out her bottom. Seen in [place] and CT shows a perineal hernia.  Transferred to [facility] for surgical intervention. Having NV and abd pain.     PAST MEDICAL HISTORY Diabetes, hypertension    PAST SURGICAL HISTORY:  Hysterectomy    ALLERGIES: No Known Allergies    CURRENT MEDICATIONS:    Home Medications       Reviewed by Lamin Barron (Pharmacy Tech) on 03/25/24 at 0344  Med List Status: Complete     Medication Last Dose Status   AMLODIPINE BESYLATE PO 3/23/2024 Active   ascorbic acid (VITAMIN C) 500 MG tablet 3/24/2024 Active   ATENOLOL PO 3/23/2024 Active   benazepril (LOTENSIN) 40 MG tablet 3/24/2024 Active   ibuprofen (MOTRIN) 200 MG Tab 3/23/2024 Active   lovastatin (MEVACOR) 40 MG tablet 3/24/2024 Active   metFORMIN (GLUCOPHAGE) 500 MG Tab 3/24/2024 Active                    FAMILY HISTORY: family history is not on file.    SOCIAL HISTORY:  reports that she has never smoked. She has never used smokeless tobacco. She reports that she does not drink alcohol and does not use drugs.    REVIEW OF SYSTEMS: Comprehensive review of systems is negative with the exception of the aforementioned HPI, PMH, and PSH bullets in accordance with CMS guidelines.    PHYSICAL EXAMINATION:    Physical Exam  Cardiovascular:      Rate and Rhythm: Normal rate.   Pulmonary:      Effort: Pulmonary effort is normal.   Abdominal:      General: There is no distension.      Palpations: Abdomen is soft.      Tenderness: There is abdominal tenderness.    Genitourinary:     Comments: 1-2 ft of small bowel either coming through vaginal vault or rectum.   Bowel appears viable  Musculoskeletal:         General: Normal range of motion.      Cervical back: Neck supple.   Skin:     General: Skin is warm.   Neurological:      General: No focal deficit present.      Mental Status: She is alert.         LABORATORY VALUES:   Recent Labs     03/25/24  0307   WBC 18.2*   RBC 4.66   HEMOGLOBIN 14.3   HEMATOCRIT 43.7   MCV 93.8   MCH 30.7   MCHC 32.7   RDW 43.4   PLATELETCT 370   MPV 10.6     Recent Labs     03/25/24  0307   SODIUM 138   POTASSIUM 5.9*   CHLORIDE 106   CO2 20   GLUCOSE 314*   BUN 30*   CREATININE 1.04   CALCIUM 8.4*     Recent Labs     03/25/24  0307   ASTSGOT 9*   ALTSGPT 14   TBILIRUBIN 0.2   ALKPHOSPHAT 89   GLOBULIN 2.7            IMAGING:   OUTSIDE IMAGES-CT ABDOMEN /PELVIS   Final Result          ASSESSMENT AND PLAN:     * Perineal hernia- (present on admission)  Assessment & Plan  Hx of hysterectomy 40 years ago  Had BM and felt bowel come out 3/24 AM  Seen in [place] and txd to [place]  Large volume of sb coming through perineum  Plan ex lap to reduce bowel           DISPOSITION: Medical evaluation and admission. The patient was admitted to the Medical Service prior to surgical consultation. [facility] [place] [facility] will follow.     ____________________________________     Arti Aguiar M.D.    DD: 3/25/2024  5:41 AM    AAST Grading System for EGS Conditions  ACS NSQIP Surgical Risk Calculator

## 2024-03-25 NOTE — CONSULTS
Reason for GYN oncology consultation: I been asked to come to the operating room #7 to evaluate a complete vaginal cuff dehiscence where the small intestine had eviscerated through the vaginal canal.    Thank you For the opportunity for allowing participate in Mrs. FERMIN's care.    HPI: Ms. Archuleta is a 74-year-old female who was admitted through the emergency room earlier today.  Patient apparently gave a history that she had a bowel movement and noted severe pelvic pain which she noted was small intestine between her legs.  She was transferred to [facility] emergency room immediately.  She is taken to the operating room by Dr. Harris to evaluate and address this surgically.  Upon arrival patient was noted to have small bowel approximately 2 to 3 feet stable with somewhat dusky through the vaginal canal.  I was asked to come into the operating room to assist in surgery.    On my arrival Dr. Harris has started the midline laparotomy.  I noted the small bowel.  This appears somewhat dilated and edematous.  We then moisten the bowel with order and we were able to reduce it and pushed about back from intraperitoneally.  Please see Dr. Harris's operative note in detail for the small bowel portion.  Upon my examination she did have a cystocele and also vaginal cuff dehiscence which was essentially dehisce approximately 4 cm in horizontal fashion.  There was no active bleeding.  Patient has an apical prolapse and a cystocele.  Patient will need eventually probably abdominal sacrocolpopexy however given this acute situation we did not elect to place or corrected vaginal apical prolapse procedure.  Patient will ultimately need most likely an abdominal sacrocolpopexy and possibly a colpocleisis.  I only did the portion of the cuff closure and Dr. Harris performed the remainder of the procedure please see her operative report for this.    Impression #1 vaginal cuff dehiscencewith bowel eviseration.     Plan: Reduction of bowel and  vaginal cuff closure  2. Possible abdominal sacral colpopexy with colpoclesis at a different stage after she heals from this surgery.

## 2024-03-25 NOTE — ANESTHESIA TIME REPORT
Anesthesia Start and Stop Event Times       Date Time Event    3/25/2024 0712 Ready for Procedure     0746 Anesthesia Start     0948 Anesthesia Stop          Responsible Staff  03/25/24      Name Role Begin End    Veronique Pires M.D. Anesth 0746 0948          Anesthesia Time Report

## 2024-03-25 NOTE — PROGRESS NOTES
PREOPERATIVE NOTE: I have seen and examined the patient today.  Critical physical examination findings, laboratory indices, and radiographic studies reviewed.  I recommend exploratory laparotomy for reduction of intestines and likely resection.    The operative strategy was explained and reviewed. Alternatives discussed. Potential complications including, but not limited to, infection, bleeding, damage to adjacent structures, and anesthetic complications were discussed. Questions were elicited and answered to the patient's satisfaction.  Operative consent signed.        ____________________________________     Lorrie Harris M.D.    DD: 3/25/2024  7:38 AM

## 2024-03-25 NOTE — ED PROVIDER NOTES
"ED Provider Note    Scribed for Dr. Augustine by Morris Castro. 3/25/2024,  2:54 AM.      CHIEF COMPLAINT  Chief Complaint   Patient presents with    Sent by MD Tierney transfer from [place] for prolapsed bowel. Pt states she went to how a BM this morning around 9 am, she felt like her \"water broke.\" Bowel is protruding out of vagina.        EXTERNAL RECORDS REVIEWED  External ED note: Physical exam notes prolapse of intestine through vagina.    HPI  LIMITATION TO HISTORY   Select: : None  OUTSIDE HISTORIAN(S):  EMS report no changes in route.    Katie Hollingsworth is a 74 y.o. female who presents to the Emergency Department for prolapsed bowel.  Patient reports she was in the shower and started to notice her \"intestine\" coming out. She describes it as if her \"water broke\". Patient  last meal was at 5 am this morning. Denies any form of cancers. Patient has a history of hysterectomy 40 years ago. Impression reports; Large rectal verus vaginal prolapse containing multiple loops of small bowel and mesenteric fat with perineal defect measuring 3.7 x 3.5 cm, Ectatic infrarenal abdominal aorta measuring up to 2.8 cm. Patient has medical history of diabetes, hypertension, and hyperglycemia.   REVIEW OF SYSTEMS  See HPI for further details. All other systems are negative.     PAST MEDICAL HISTORY   History reviewed. No pertinent past medical history.  Hypertension, diabetes, hyperlipidemia    SURGICAL HISTORY  History reviewed. No pertinent surgical history.    FAMILY HISTORY  History reviewed. No pertinent family history.    SOCIAL HISTORY    reports that she has never smoked. She has never used smokeless tobacco. She reports that she does not drink alcohol and does not use drugs.None noted.    CURRENT MEDICATIONS  Home Medications       Reviewed by Lamin Barron (Pharmacy Tech) on 03/25/24 at 0344  Med List Status: Complete     Medication Last Dose Status   AMLODIPINE BESYLATE PO 3/23/2024 Active   ascorbic acid (VITAMIN C) 500 " "MG tablet 3/24/2024 Active   ATENOLOL PO 3/23/2024 Active   benazepril (LOTENSIN) 40 MG tablet 3/24/2024 Active   ibuprofen (MOTRIN) 200 MG Tab 3/23/2024 Active   lovastatin (MEVACOR) 40 MG tablet 3/24/2024 Active   metFORMIN (GLUCOPHAGE) 500 MG Tab 3/24/2024 Active                    ALLERGIES  No Known Allergies    PHYSICAL EXAM  VITAL SIGNS: /65   Pulse 65   Temp 36.3 °C (97.4 °F) (Temporal)   Resp 16   Ht 1.651 m (5' 5\")   Wt 70.3 kg (155 lb)   SpO2 96%   BMI 25.79 kg/m²   Gen: Alert, no acute distress  HEENT: ATNC  Eyes: PERRL, EOMI, normal conjunctiva  Neck: trachea midline  Resp: no respiratory distress  CV: No JVD, regular rate and rhythm  Abd: non-distended, mild abdominal tenderness.  Soft, large volume beefy red small bowel present in the groin.  Ext: No deformities  Neuro: speech fluent    DIAGNOSTIC STUDIES / PROCEDURES    LABS  Labs Reviewed   CBC WITH DIFFERENTIAL - Abnormal; Notable for the following components:       Result Value    WBC 18.2 (*)     Neutrophils-Polys 92.50 (*)     Lymphocytes 3.50 (*)     Neutrophils (Absolute) 16.85 (*)     Lymphs (Absolute) 0.64 (*)     All other components within normal limits   COMP METABOLIC PANEL - Abnormal; Notable for the following components:    Potassium 5.9 (*)     Glucose 314 (*)     Bun 30 (*)     Calcium 8.4 (*)     AST(SGOT) 9 (*)     All other components within normal limits   ESTIMATED GFR - Abnormal; Notable for the following components:    GFR (CKD-EPI) 56 (*)     All other components within normal limits   COD (ADULT)   ABO RH CONFIRM         RADIOLOGY  I have independently interpreted the diagnostic imaging associated with this visit.  My preliminary interpretation is as follows: Intestinal prolapse including mesentery likely through the vagina  Radiologist interpretation:    OUTSIDE IMAGES-CT ABDOMEN /PELVIS   Final Result          COURSE & MEDICAL DECISION MAKING  Pertinent Labs & Imaging studies were reviewed. (See chart for " details)    2:54 AM Patient seen and examined at bedside.       INITIAL ASSESSMENT AND PLAN  Medical Decision Making: Patient presents with a prolapse of her small intestine from what appears to be the vagina.  She has a remote history of hysterectomy.  She is hemodynamically stable and has already received antibiotics.  Screening labs sent which demonstrate hyperkalemia and hyperglycemia.  EKG demonstrates no high risk features such as wide QRS or loss of P waves.  She was given IV fluids and insulin for these.  Case discussed with Dr. Oden, OB/GYN, who will consult on the case and evaluate the patient for emergent surgical intervention    Given the patient's medical abnormalities, case discussed with the hospitalist, Dr. Holder, who evaluate the patient for hospitalization    The total critical care time on this patient is 33 minutes, resuscitating patient, speaking with admitting physician, and deciphering test results. This 33 minutes is exclusive of separately billable procedures.     ADDITIONAL PROBLEM LIST AND DISPOSITION    I have discussed management of the patient with the following medical professionals: See above    Escalation of care considered, and ultimately not performed: diagnostic imaging.       DISPOSITION:  Patient will be hospitalized by Dr. Holder in guarded condition.       FINAL IMPRESSION  1. Vaginal prolapse    2. Hyperkalemia    3. Hyperglycemia    4.  Exposed small intestine     Morris WILKES (Kamla), am scribing for, and in the presence of, Britton Augustine M.D..    Electronically signed by: Morris Castro (Kamla), 3/25/2024    Britton WILKES M.D. personally performed the services described in this documentation, as scribed by Morris Castro in my presence, and it is both accurate and complete.    The note accurately reflects work and decisions made by me.  Britton Augustine M.D.  3/25/2024  4:44 AM      This dictation was created using voice recognition software. The accuracy of the  dictation is limited to the abilities of the software. I expect there may be some errors of grammar and possibly content. The nursing notes were reviewed and certain aspects of this information were incorporated into this note.

## 2024-03-25 NOTE — PROGRESS NOTES
Pt to T922 at 1159    Patient wearing full bottom dentures and partial uppers.    4 Eyes Skin Assessment Completed by KATIE Vasques and KATIE Aviles.    Head WDL  Ears WDL  Nose WDL  Mouth WDL  Neck WDL  Breast/Chest B./L breast rash  Shoulder Blades WDL  Spine WDL  (R) Arm/Elbow/Hand WDL PIV   (L) Arm/Elbow/Hand WDL PIV  Abdomen MLI with island dressing  Groin WDL uribe, sutures within perineum report  Scrotum/Coccyx/Buttocks Redness and Blanching  (R) Leg WDL  (L) Leg WDL  (R) Heel/Foot/Toe WDL  (L) Heel/Foot/Toe WNL    Devices In Places ECG, Blood Pressure Cuff, Pulse Ox, Uribe, SCD's, and Nasal Cannula      Interventions In Place Gray Ear Foams, Sacral Mepilex, TAP System, Pillows, Q2 Turns, Low Air Loss Mattress, Dri-Rolly Pads, and Heels Loaded W/Pillows    Possible Skin Injury rash    Pictures Uploaded Into Epic Yes  Wound Consult Placed Yes  RN Wound Prevention Protocol Ordered Yes    Belongings: Only sweatshirt sweat pants slippers. All cell phone wallet jewelry and other belongings sent home with family yesterday.

## 2024-03-25 NOTE — ED NOTES
Blood work sent to lab. Pt resting in [facility]. Breathing unlabored. Bed in the lowest position, side rails up, Call light within reach. Updated on POC.

## 2024-03-25 NOTE — ANESTHESIA PROCEDURE NOTES
Airway    Date/Time: 3/25/2024 7:51 AM    Performed by: Veronique Pires M.D.  Authorized by: Veronique Pires M.D.    Location:  OR  Urgency:  Elective  Difficult Airway: No    Indications for Airway Management:  Anesthesia      Spontaneous Ventilation: absent    Sedation Level:  Deep  Preoxygenated: Yes    Patient Position:  Sniffing  Mask Difficulty Assessment:  0 - not attempted  Final Airway Type:  Endotracheal airway  Final Endotracheal Airway:  ETT  Cuffed: Yes    Technique Used for Successful ETT Placement:  Direct laryngoscopy  Devices/Methods Used in Placement:  Cricoid pressure    Insertion Site:  Oral  Blade Type:  Glide  Laryngoscope Blade/Videolaryngoscope Blade Size:  3  ETT Size (mm):  7.0  Measured from:  Teeth  ETT to Teeth (cm):  22  Placement Verified by: auscultation and capnometry    Cormack-Lehane Classification:  Grade I - full view of glottis  Number of Attempts at Approach:  1   RSI

## 2024-03-25 NOTE — ANESTHESIA POSTPROCEDURE EVALUATION
Patient: Katie Hollingsworth    Procedure Summary       Date: 03/25/24 Room / Location: [place] [id number] / SURGERY [facility]    Anesthesia Start: 0746 Anesthesia Stop: 0948    Procedure: LAPAROTOMY, EXPLORATORY, REDUCTION OF SMALL BOWEL , SMALL BOWEL CLOSURE, VAGINAL CUFF CLOSURE (Abdomen) Diagnosis: (VAGINA HERNIATION OF SMALL BOWEL)    Surgeons: Lorrie Harris M.D. Responsible Provider: Veronique Pires M.D.    Anesthesia Type: general ASA Status: 3 - Emergent            Final Anesthesia Type: general  Last vitals  BP   Blood Pressure : 132/61    Temp   36.4 °C (97.6 °F)    Pulse   (!) 54   Resp   (!) 22    SpO2   94 %      Anesthesia Post Evaluation    Patient location during evaluation: PACU  Patient participation: complete - patient participated  Level of consciousness: awake and alert    Airway patency: patent  Anesthetic complications: no  Cardiovascular status: hemodynamically stable  Respiratory status: acceptable  Hydration status: euvolemic  Comments: Repeat potassium in PACU 5.2.  Per surgeon, patient will be admitted to ICU for continued close monitoring of renal status/potassium.    PONV: none          There were no known notable events for this encounter.     Nurse Pain Score: 0 (NPRS)

## 2024-03-26 PROBLEM — Z78.9 NO CONTRAINDICATION TO DEEP VEIN THROMBOSIS (DVT) PROPHYLAXIS: Status: ACTIVE | Noted: 2024-03-26

## 2024-03-26 PROBLEM — E78.5 HYPERLIPIDEMIA: Status: ACTIVE | Noted: 2024-03-26

## 2024-03-26 PROBLEM — K63.4: Status: ACTIVE | Noted: 2024-03-25

## 2024-03-26 PROBLEM — K63.4: Status: RESOLVED | Noted: 2024-03-26 | Resolved: 2024-03-26

## 2024-03-26 PROBLEM — R11.2 PONV (POSTOPERATIVE NAUSEA AND VOMITING): Status: RESOLVED | Noted: 2024-03-25 | Resolved: 2024-03-26

## 2024-03-26 PROBLEM — K63.4: Status: ACTIVE | Noted: 2024-03-26

## 2024-03-26 PROBLEM — Z98.890 PONV (POSTOPERATIVE NAUSEA AND VOMITING): Status: RESOLVED | Noted: 2024-03-25 | Resolved: 2024-03-26

## 2024-03-26 PROBLEM — D72.829 LEUKOCYTOSIS: Status: RESOLVED | Noted: 2024-03-25 | Resolved: 2024-03-26

## 2024-03-26 LAB
ALBUMIN SERPL BCP-MCNC: 2.6 G/DL (ref 3.2–4.9)
ALBUMIN/GLOB SERPL: 1.1 G/DL
ALP SERPL-CCNC: 62 U/L (ref 30–99)
ALT SERPL-CCNC: 8 U/L (ref 2–50)
ANION GAP SERPL CALC-SCNC: 11 MMOL/L (ref 7–16)
AST SERPL-CCNC: 14 U/L (ref 12–45)
BASOPHILS # BLD AUTO: 0.3 % (ref 0–1.8)
BASOPHILS # BLD: 0.03 K/UL (ref 0–0.12)
BILIRUB SERPL-MCNC: <0.2 MG/DL (ref 0.1–1.5)
BUN SERPL-MCNC: 31 MG/DL (ref 8–22)
CALCIUM ALBUM COR SERPL-MCNC: 9.3 MG/DL (ref 8.5–10.5)
CALCIUM SERPL-MCNC: 8.2 MG/DL (ref 8.5–10.5)
CHLORIDE SERPL-SCNC: 114 MMOL/L (ref 96–112)
CO2 SERPL-SCNC: 17 MMOL/L (ref 20–33)
CREAT SERPL-MCNC: 1.21 MG/DL (ref 0.5–1.4)
EOSINOPHIL # BLD AUTO: 0 K/UL (ref 0–0.51)
EOSINOPHIL NFR BLD: 0 % (ref 0–6.9)
ERYTHROCYTE [DISTWIDTH] IN BLOOD BY AUTOMATED COUNT: 46.4 FL (ref 35.9–50)
GFR SERPLBLD CREATININE-BSD FMLA CKD-EPI: 47 ML/MIN/1.73 M 2
GLOBULIN SER CALC-MCNC: 2.4 G/DL (ref 1.9–3.5)
GLUCOSE BLD STRIP.AUTO-MCNC: 113 MG/DL (ref 65–99)
GLUCOSE BLD STRIP.AUTO-MCNC: 136 MG/DL (ref 65–99)
GLUCOSE BLD STRIP.AUTO-MCNC: 159 MG/DL (ref 65–99)
GLUCOSE SERPL-MCNC: 143 MG/DL (ref 65–99)
HCT VFR BLD AUTO: 39.3 % (ref 37–47)
HGB BLD-MCNC: 12.7 G/DL (ref 12–16)
IMM GRANULOCYTES # BLD AUTO: 0.04 K/UL (ref 0–0.11)
IMM GRANULOCYTES NFR BLD AUTO: 0.4 % (ref 0–0.9)
LYMPHOCYTES # BLD AUTO: 0.9 K/UL (ref 1–4.8)
LYMPHOCYTES NFR BLD: 8.1 % (ref 22–41)
MCH RBC QN AUTO: 30.4 PG (ref 27–33)
MCHC RBC AUTO-ENTMCNC: 32.3 G/DL (ref 32.2–35.5)
MCV RBC AUTO: 94 FL (ref 81.4–97.8)
MONOCYTES # BLD AUTO: 0.65 K/UL (ref 0–0.85)
MONOCYTES NFR BLD AUTO: 5.8 % (ref 0–13.4)
NEUTROPHILS # BLD AUTO: 9.5 K/UL (ref 1.82–7.42)
NEUTROPHILS NFR BLD: 85.4 % (ref 44–72)
NRBC # BLD AUTO: 0 K/UL
NRBC BLD-RTO: 0 /100 WBC (ref 0–0.2)
PLATELET # BLD AUTO: 283 K/UL (ref 164–446)
PMV BLD AUTO: 10.4 FL (ref 9–12.9)
POTASSIUM SERPL-SCNC: 5.1 MMOL/L (ref 3.6–5.5)
PROT SERPL-MCNC: 5 G/DL (ref 6–8.2)
RBC # BLD AUTO: 4.18 M/UL (ref 4.2–5.4)
SODIUM SERPL-SCNC: 142 MMOL/L (ref 135–145)
WBC # BLD AUTO: 11.1 K/UL (ref 4.8–10.8)

## 2024-03-26 PROCEDURE — 700102 HCHG RX REV CODE 250 W/ 637 OVERRIDE(OP)

## 2024-03-26 PROCEDURE — 700105 HCHG RX REV CODE 258

## 2024-03-26 PROCEDURE — 700111 HCHG RX REV CODE 636 W/ 250 OVERRIDE (IP): Mod: JZ,JG | Performed by: INTERNAL MEDICINE

## 2024-03-26 PROCEDURE — 700111 HCHG RX REV CODE 636 W/ 250 OVERRIDE (IP): Performed by: SURGERY

## 2024-03-26 PROCEDURE — 99232 SBSQ HOSP IP/OBS MODERATE 35: CPT | Mod: 24

## 2024-03-26 PROCEDURE — A9270 NON-COVERED ITEM OR SERVICE: HCPCS

## 2024-03-26 PROCEDURE — 82962 GLUCOSE BLOOD TEST: CPT | Mod: 91

## 2024-03-26 PROCEDURE — 700111 HCHG RX REV CODE 636 W/ 250 OVERRIDE (IP)

## 2024-03-26 PROCEDURE — 85025 COMPLETE CBC W/AUTO DIFF WBC: CPT

## 2024-03-26 PROCEDURE — 700102 HCHG RX REV CODE 250 W/ 637 OVERRIDE(OP): Performed by: INTERNAL MEDICINE

## 2024-03-26 PROCEDURE — 99024 POSTOP FOLLOW-UP VISIT: CPT | Mod: DUPCHRG | Performed by: SURGERY

## 2024-03-26 PROCEDURE — 700105 HCHG RX REV CODE 258: Performed by: SURGERY

## 2024-03-26 PROCEDURE — A9270 NON-COVERED ITEM OR SERVICE: HCPCS | Performed by: INTERNAL MEDICINE

## 2024-03-26 PROCEDURE — 770001 HCHG ROOM/CARE - MED/SURG/GYN PRIV*

## 2024-03-26 PROCEDURE — 80053 COMPREHEN METABOLIC PANEL: CPT

## 2024-03-26 RX ORDER — HYDRALAZINE HYDROCHLORIDE 20 MG/ML
10 INJECTION INTRAMUSCULAR; INTRAVENOUS EVERY 4 HOURS PRN
Status: DISCONTINUED | OUTPATIENT
Start: 2024-03-26 | End: 2024-04-02 | Stop reason: HOSPADM

## 2024-03-26 RX ORDER — LOVASTATIN 20 MG/1
40 TABLET ORAL DAILY
Status: DISCONTINUED | OUTPATIENT
Start: 2024-03-26 | End: 2024-04-02 | Stop reason: HOSPADM

## 2024-03-26 RX ORDER — NYSTATIN 100000 [USP'U]/G
POWDER TOPICAL 2 TIMES DAILY
Status: DISCONTINUED | OUTPATIENT
Start: 2024-03-26 | End: 2024-04-02 | Stop reason: HOSPADM

## 2024-03-26 RX ORDER — AMLODIPINE BESYLATE 10 MG/1
10 TABLET ORAL DAILY
Status: DISCONTINUED | OUTPATIENT
Start: 2024-03-26 | End: 2024-04-02 | Stop reason: HOSPADM

## 2024-03-26 RX ORDER — ATENOLOL 50 MG/1
25 TABLET ORAL
Status: DISCONTINUED | OUTPATIENT
Start: 2024-03-26 | End: 2024-04-02 | Stop reason: HOSPADM

## 2024-03-26 RX ADMIN — LOVASTATIN 40 MG: 20 TABLET ORAL at 12:41

## 2024-03-26 RX ADMIN — OXYCODONE 5 MG: 5 TABLET ORAL at 20:06

## 2024-03-26 RX ADMIN — MICAFUNGIN SODIUM 100 MG: 100 INJECTION, POWDER, LYOPHILIZED, FOR SOLUTION INTRAVENOUS at 05:07

## 2024-03-26 RX ADMIN — MORPHINE SULFATE 2 MG: 4 INJECTION, SOLUTION INTRAMUSCULAR; INTRAVENOUS at 05:36

## 2024-03-26 RX ADMIN — OXYCODONE 5 MG: 5 TABLET ORAL at 16:27

## 2024-03-26 RX ADMIN — INSULIN HUMAN 2 UNITS: 100 INJECTION, SOLUTION PARENTERAL at 17:46

## 2024-03-26 RX ADMIN — SODIUM CHLORIDE: 9 INJECTION, SOLUTION INTRAVENOUS at 09:04

## 2024-03-26 RX ADMIN — OXYCODONE 5 MG: 5 TABLET ORAL at 12:40

## 2024-03-26 RX ADMIN — PIPERACILLIN AND TAZOBACTAM 4.5 G: 4; .5 INJECTION, POWDER, FOR SOLUTION INTRAVENOUS at 20:12

## 2024-03-26 RX ADMIN — ATENOLOL 25 MG: 25 TABLET ORAL at 11:50

## 2024-03-26 RX ADMIN — HYDRALAZINE HYDROCHLORIDE 10 MG: 20 INJECTION INTRAMUSCULAR; INTRAVENOUS at 16:26

## 2024-03-26 RX ADMIN — PIPERACILLIN AND TAZOBACTAM 4.5 G: 4; .5 INJECTION, POWDER, FOR SOLUTION INTRAVENOUS at 12:48

## 2024-03-26 RX ADMIN — MORPHINE SULFATE 2 MG: 4 INJECTION, SOLUTION INTRAMUSCULAR; INTRAVENOUS at 08:58

## 2024-03-26 RX ADMIN — ENOXAPARIN SODIUM 40 MG: 100 INJECTION SUBCUTANEOUS at 17:39

## 2024-03-26 RX ADMIN — PIPERACILLIN AND TAZOBACTAM 4.5 G: 4; .5 INJECTION, POWDER, FOR SOLUTION INTRAVENOUS at 05:08

## 2024-03-26 RX ADMIN — INSULIN HUMAN 2 UNITS: 100 INJECTION, SOLUTION PARENTERAL at 00:11

## 2024-03-26 RX ADMIN — AMLODIPINE BESYLATE 10 MG: 10 TABLET ORAL at 11:50

## 2024-03-26 RX ADMIN — SODIUM CHLORIDE: 9 INJECTION, SOLUTION INTRAVENOUS at 20:10

## 2024-03-26 ASSESSMENT — ENCOUNTER SYMPTOMS
RESPIRATORY NEGATIVE: 1
CONSTITUTIONAL NEGATIVE: 1
CHILLS: 0
EYES NEGATIVE: 1
NEUROLOGICAL NEGATIVE: 1
ABDOMINAL PAIN: 1
MUSCULOSKELETAL NEGATIVE: 1
NAUSEA: 0
VOMITING: 0
COUGH: 0
FEVER: 0
SHORTNESS OF BREATH: 0
CARDIOVASCULAR NEGATIVE: 1

## 2024-03-26 ASSESSMENT — COGNITIVE AND FUNCTIONAL STATUS - GENERAL
WALKING IN HOSPITAL ROOM: A LITTLE
CLIMB 3 TO 5 STEPS WITH RAILING: A LITTLE
MOBILITY SCORE: 21
STANDING UP FROM CHAIR USING ARMS: A LITTLE
DAILY ACTIVITIY SCORE: 24
SUGGESTED CMS G CODE MODIFIER DAILY ACTIVITY: CH
SUGGESTED CMS G CODE MODIFIER MOBILITY: CJ

## 2024-03-26 ASSESSMENT — LIFESTYLE VARIABLES
ON A TYPICAL DAY WHEN YOU DRINK ALCOHOL HOW MANY DRINKS DO YOU HAVE: 0
ALCOHOL_USE: NO
HAVE YOU EVER FELT YOU SHOULD CUT DOWN ON YOUR DRINKING: NO
AVERAGE NUMBER OF DAYS PER WEEK YOU HAVE A DRINK CONTAINING ALCOHOL: 0
TOTAL SCORE: 0
EVER HAD A DRINK FIRST THING IN THE MORNING TO STEADY YOUR NERVES TO GET RID OF A HANGOVER: NO
TOTAL SCORE: 0
CONSUMPTION TOTAL: NEGATIVE
HOW MANY TIMES IN THE PAST YEAR HAVE YOU HAD 5 OR MORE DRINKS IN A DAY: 0
TOTAL SCORE: 0
DOES PATIENT WANT TO STOP DRINKING: NO
HAVE PEOPLE ANNOYED YOU BY CRITICIZING YOUR DRINKING: NO
EVER FELT BAD OR GUILTY ABOUT YOUR DRINKING: NO

## 2024-03-26 ASSESSMENT — PAIN DESCRIPTION - PAIN TYPE
TYPE: ACUTE PAIN

## 2024-03-26 ASSESSMENT — FIBROSIS 4 INDEX
FIB4 SCORE: 0.49
FIB4 SCORE: 1.31

## 2024-03-26 NOTE — PROGRESS NOTES
4 Eyes Skin Assessment Completed by KATIE Wilson and KATIE Bland.    Head WDL  Ears WDL  Nose WDL  Mouth WDL  Neck WDL  Breast/Chest Redness and Rash dry flaky  Shoulder Blades WDL  Spine WDL  (R) Arm/Elbow/Hand WDL  (L) Arm/Elbow/Hand WDL  Abdomen Incision  Groin WDL  Scrotum/Coccyx/Buttocks Redness, Blanching, and Moisture Fissure  (R) Leg WDL  (L) Leg WDL  (R) Heel/Foot/Toe Redness, Blanching, and Boggy  (L) Heel/Foot/Toe Redness, Blanching, and Boggy          Devices In Places Pulse Ox and Nasal Cannula      Interventions In Place NC W/Ear Foams    Possible Skin Injury No    Pictures Uploaded Into Epic Yes  Wound Consult Placed N/A  RN Wound Prevention Protocol Ordered No

## 2024-03-26 NOTE — PROGRESS NOTES
Patient was seen at bedside and chart was reviewed. Please see Dr. Beltran's note for further details.  Pt seen post surgery. She should be NPO, only ice chips. I did reconfirm with surgery.   HR 59, /63.   Continue zosyn, micafungin, monitor electrolytes regularly. She has major surgery.   Continue pain management   Follow up with surgery recs     Anastacio Tabor M.D.

## 2024-03-26 NOTE — ASSESSMENT & PLAN NOTE
Chronic condition treated with atenolol, amlodipine & benazepril.  3/26 Resumed atenolol & amlodipine.  3/29 Resumed benazepril.

## 2024-03-26 NOTE — CARE PLAN
The patient is Stable - Low risk of patient condition declining or worsening    Shift Goals  Clinical Goals: pain management  Patient Goals: Comfort, sleep  Family Goals: HANY - no family present    Progress made toward(s) clinical / shift goals:  pt has Prn medication for pain management. Is able to make needs known. Transferred out of ICU.       Problem: Knowledge Deficit - Standard  Goal: Patient and family/care givers will demonstrate understanding of plan of care, disease process/condition, diagnostic tests and medications  Outcome: Progressing     Problem: Pain - Standard  Goal: Alleviation of pain or a reduction in pain to the patient’s comfort goal  Outcome: Progressing       Patient is not progressing towards the following goals:

## 2024-03-26 NOTE — PROGRESS NOTES
Surgical team this evening care in this patient  [facility] medicine signing off

## 2024-03-26 NOTE — PROGRESS NOTES
"  DATE: 3/26/2024    Post Operative Day  1 exploratory laparotomy and bowel resection.    INTERVAL EVENTS:  No acute events. Appropriately tender. No nausea.  Tolerating ice chips.    PHYSICAL EXAMINATION:  Vital Signs: BP (!) 154/67   Pulse 68   Temp 36.6 °C (97.8 °F) (Temporal)   Resp (!) 22   Ht 1.651 m (5' 5\")   Wt 69.4 kg (153 lb)   SpO2 95%     Awake and alert.  Abdomen soft and nontender, moderately distended.    LABORATORY VALUES:   Recent Labs     03/25/24  0307 03/26/24  0518   WBC 18.2* 11.1*   RBC 4.66 4.18*   HEMOGLOBIN 14.3 12.7   HEMATOCRIT 43.7 39.3   MCV 93.8 94.0   MCH 30.7 30.4   MCHC 32.7 32.3   RDW 43.4 46.4   PLATELETCT 370 283   MPV 10.6 10.4     Recent Labs     03/25/24  0649 03/25/24  1003 03/26/24  0518   SODIUM 139 140 142   POTASSIUM 5.9* 5.2 5.1   CHLORIDE 109 114* 114*   CO2 19* 17* 17*   GLUCOSE 250* 209* 143*   BUN 30* 29* 31*   CREATININE 0.97 1.03 1.21   CALCIUM 8.5 8.5 8.2*     Recent Labs     03/25/24  0307 03/26/24  0518   ASTSGOT 9* 14   ALTSGPT 14 8   TBILIRUBIN 0.2 <0.2   ALKPHOSPHAT 89 62   GLOBULIN 2.7 2.4            IMAGING:   OUTSIDE IMAGES-CT ABDOMEN /PELVIS   Final Result          ASSESSMENT AND PLAN:  S/p ex lap with bowel resection.  Recommend abdominal binder and ambulation. Potassium improved. Transfer out of ICU. Continue antibiotics.    Appreciate ICU and consulting physician care.       ____________________________________     Lorrie Harris M.D.    DD: 3/26/2024  8:20 AM  .  "

## 2024-03-26 NOTE — WOUND TEAM
"[facility]  Inpatient Services  Wound and Skin Care Brief Evaluation    Admission Date: 3/25/2024     Last order of IP CONSULT TO WOUND CARE was found on 3/25/2024 from Hospital Encounter on 3/24/2024     HPI, PMH, SH: Reviewed    Chief Complaint   Patient presents with    Sent by MD     Pt transfer from [place] for prolapsed bowel. Pt states she went to [place] this morning around 9 am, she felt like her \"water broke.\" Bowel is protruding out of vagina.      Diagnosis: Vaginal prolapse [N81.10]  Hyperkalemia [E87.5]  Prolapse of intestine [K63.4]    Unit where seen by Wound Team: T922    Wound consult placed regarding under breasts. Chart and images reviewed. This discussed with bedside RN Ale. This clinician in to assess patient. Patient pleasant and agreeable. Patient under breasts with small moisture rash  noted, patient states she usually has a rash there, interdry to be applied and antifungal ordered. Patient heels intact and sacrum intact.      No pressure injuries or advanced wound care needs identified. Wound consult completed. No further follow up unless indicated and consulted.     Moisture Associated Skin Damage 03/26/24 Breast (Active)   First Observed Date/First Observed Time: 03/26/24 1525   Present on Original Admission: Yes  Laterality: Bilateral  Wound Location : Breast      Assessments 3/26/2024 11:00 AM   Wound Image      NEXT Weekly Photo (Inpatient Only) 04/02/24   Drainage Amount Scant   Drainage Description Serosanguineous   Periwound Assessment Pink   IAD Cleansing Foam Cleanser/Washcloth   Periwound Protectant Antifungal Therapy   IAD Containment Device Interdry Cloth   WOUND NURSE ONLY - Time Spent with Patient (mins) 30       PREVENTATIVE INTERVENTIONS:    Q shift Ron - performed per nursing policy  Q shift pressure point assessments - performed per nursing policy    Surface/Positioning  ICU Low Airloss - Currently in Place  Reposition q 2 hours - Currently in " Place    Offloading/Redistribution  Sacral offloading dressing (Silicone dressing) - Currently in Place  Heel offloading dressing (Silicone dressing) - Currently in Place

## 2024-03-26 NOTE — ASSESSMENT & PLAN NOTE
Chronic condition treated with metformin.  Holding maintenance metformin.  Insulin sliding scale coverage.  Plan to resumed upon discharge.

## 2024-03-27 LAB
ANION GAP SERPL CALC-SCNC: 12 MMOL/L (ref 7–16)
BASOPHILS # BLD AUTO: 0.4 % (ref 0–1.8)
BASOPHILS # BLD: 0.05 K/UL (ref 0–0.12)
BUN SERPL-MCNC: 28 MG/DL (ref 8–22)
CALCIUM SERPL-MCNC: 8.4 MG/DL (ref 8.5–10.5)
CHLORIDE SERPL-SCNC: 114 MMOL/L (ref 96–112)
CO2 SERPL-SCNC: 16 MMOL/L (ref 20–33)
CREAT SERPL-MCNC: 1.11 MG/DL (ref 0.5–1.4)
EOSINOPHIL # BLD AUTO: 0.01 K/UL (ref 0–0.51)
EOSINOPHIL NFR BLD: 0.1 % (ref 0–6.9)
ERYTHROCYTE [DISTWIDTH] IN BLOOD BY AUTOMATED COUNT: 47.2 FL (ref 35.9–50)
GFR SERPLBLD CREATININE-BSD FMLA CKD-EPI: 52 ML/MIN/1.73 M 2
GLUCOSE BLD STRIP.AUTO-MCNC: 146 MG/DL (ref 65–99)
GLUCOSE BLD STRIP.AUTO-MCNC: 146 MG/DL (ref 65–99)
GLUCOSE BLD STRIP.AUTO-MCNC: 158 MG/DL (ref 65–99)
GLUCOSE BLD STRIP.AUTO-MCNC: 204 MG/DL (ref 65–99)
GLUCOSE SERPL-MCNC: 144 MG/DL (ref 65–99)
HCT VFR BLD AUTO: 35.9 % (ref 37–47)
HGB BLD-MCNC: 11.6 G/DL (ref 12–16)
IMM GRANULOCYTES # BLD AUTO: 0.08 K/UL (ref 0–0.11)
IMM GRANULOCYTES NFR BLD AUTO: 0.6 % (ref 0–0.9)
LYMPHOCYTES # BLD AUTO: 1.14 K/UL (ref 1–4.8)
LYMPHOCYTES NFR BLD: 8.6 % (ref 22–41)
MCH RBC QN AUTO: 30.5 PG (ref 27–33)
MCHC RBC AUTO-ENTMCNC: 32.3 G/DL (ref 32.2–35.5)
MCV RBC AUTO: 94.5 FL (ref 81.4–97.8)
MONOCYTES # BLD AUTO: 0.8 K/UL (ref 0–0.85)
MONOCYTES NFR BLD AUTO: 6.1 % (ref 0–13.4)
NEUTROPHILS # BLD AUTO: 11.12 K/UL (ref 1.82–7.42)
NEUTROPHILS NFR BLD: 84.2 % (ref 44–72)
NRBC # BLD AUTO: 0 K/UL
NRBC BLD-RTO: 0 /100 WBC (ref 0–0.2)
PLATELET # BLD AUTO: 297 K/UL (ref 164–446)
PMV BLD AUTO: 10.3 FL (ref 9–12.9)
POTASSIUM SERPL-SCNC: 4.3 MMOL/L (ref 3.6–5.5)
RBC # BLD AUTO: 3.8 M/UL (ref 4.2–5.4)
SODIUM SERPL-SCNC: 142 MMOL/L (ref 135–145)
WBC # BLD AUTO: 13.2 K/UL (ref 4.8–10.8)

## 2024-03-27 PROCEDURE — 36415 COLL VENOUS BLD VENIPUNCTURE: CPT

## 2024-03-27 PROCEDURE — 700102 HCHG RX REV CODE 250 W/ 637 OVERRIDE(OP)

## 2024-03-27 PROCEDURE — 700111 HCHG RX REV CODE 636 W/ 250 OVERRIDE (IP)

## 2024-03-27 PROCEDURE — 700105 HCHG RX REV CODE 258

## 2024-03-27 PROCEDURE — 700102 HCHG RX REV CODE 250 W/ 637 OVERRIDE(OP): Performed by: SURGERY

## 2024-03-27 PROCEDURE — A9270 NON-COVERED ITEM OR SERVICE: HCPCS | Performed by: INTERNAL MEDICINE

## 2024-03-27 PROCEDURE — 82962 GLUCOSE BLOOD TEST: CPT | Mod: 91

## 2024-03-27 PROCEDURE — 700105 HCHG RX REV CODE 258: Performed by: SURGERY

## 2024-03-27 PROCEDURE — 700111 HCHG RX REV CODE 636 W/ 250 OVERRIDE (IP): Mod: JZ | Performed by: INTERNAL MEDICINE

## 2024-03-27 PROCEDURE — A9270 NON-COVERED ITEM OR SERVICE: HCPCS

## 2024-03-27 PROCEDURE — A9270 NON-COVERED ITEM OR SERVICE: HCPCS | Performed by: SURGERY

## 2024-03-27 PROCEDURE — 85025 COMPLETE CBC W/AUTO DIFF WBC: CPT

## 2024-03-27 PROCEDURE — 99024 POSTOP FOLLOW-UP VISIT: CPT | Performed by: NURSE PRACTITIONER

## 2024-03-27 PROCEDURE — 770001 HCHG ROOM/CARE - MED/SURG/GYN PRIV*

## 2024-03-27 PROCEDURE — 80048 BASIC METABOLIC PNL TOTAL CA: CPT

## 2024-03-27 PROCEDURE — 700111 HCHG RX REV CODE 636 W/ 250 OVERRIDE (IP): Mod: JZ | Performed by: SURGERY

## 2024-03-27 PROCEDURE — 700102 HCHG RX REV CODE 250 W/ 637 OVERRIDE(OP): Performed by: INTERNAL MEDICINE

## 2024-03-27 RX ADMIN — INSULIN HUMAN 3 UNITS: 100 INJECTION, SOLUTION PARENTERAL at 17:38

## 2024-03-27 RX ADMIN — AMLODIPINE BESYLATE 10 MG: 10 TABLET ORAL at 04:10

## 2024-03-27 RX ADMIN — PIPERACILLIN AND TAZOBACTAM 4.5 G: 4; .5 INJECTION, POWDER, FOR SOLUTION INTRAVENOUS at 20:03

## 2024-03-27 RX ADMIN — OXYCODONE 5 MG: 5 TABLET ORAL at 08:13

## 2024-03-27 RX ADMIN — PIPERACILLIN AND TAZOBACTAM 4.5 G: 4; .5 INJECTION, POWDER, FOR SOLUTION INTRAVENOUS at 12:54

## 2024-03-27 RX ADMIN — OXYCODONE 5 MG: 5 TABLET ORAL at 20:02

## 2024-03-27 RX ADMIN — ENOXAPARIN SODIUM 40 MG: 100 INJECTION SUBCUTANEOUS at 17:36

## 2024-03-27 RX ADMIN — ONDANSETRON 4 MG: 4 TABLET, ORALLY DISINTEGRATING ORAL at 08:13

## 2024-03-27 RX ADMIN — SODIUM CHLORIDE: 9 INJECTION, SOLUTION INTRAVENOUS at 11:45

## 2024-03-27 RX ADMIN — MICAFUNGIN SODIUM 100 MG: 100 INJECTION, POWDER, LYOPHILIZED, FOR SOLUTION INTRAVENOUS at 04:09

## 2024-03-27 RX ADMIN — NYSTATIN: 100000 POWDER TOPICAL at 17:36

## 2024-03-27 RX ADMIN — INSULIN HUMAN 2 UNITS: 100 INJECTION, SOLUTION PARENTERAL at 11:37

## 2024-03-27 RX ADMIN — HYDRALAZINE HYDROCHLORIDE 10 MG: 20 INJECTION INTRAMUSCULAR; INTRAVENOUS at 11:37

## 2024-03-27 RX ADMIN — HYDRALAZINE HYDROCHLORIDE 10 MG: 20 INJECTION INTRAMUSCULAR; INTRAVENOUS at 08:03

## 2024-03-27 RX ADMIN — SODIUM CHLORIDE: 9 INJECTION, SOLUTION INTRAVENOUS at 02:54

## 2024-03-27 RX ADMIN — HYDRALAZINE HYDROCHLORIDE 10 MG: 20 INJECTION INTRAMUSCULAR; INTRAVENOUS at 20:28

## 2024-03-27 RX ADMIN — NYSTATIN: 100000 POWDER TOPICAL at 04:09

## 2024-03-27 RX ADMIN — PIPERACILLIN AND TAZOBACTAM 4.5 G: 4; .5 INJECTION, POWDER, FOR SOLUTION INTRAVENOUS at 04:08

## 2024-03-27 RX ADMIN — ATENOLOL 25 MG: 25 TABLET ORAL at 04:09

## 2024-03-27 RX ADMIN — LOVASTATIN 40 MG: 20 TABLET ORAL at 04:09

## 2024-03-27 RX ADMIN — OXYCODONE 5 MG: 5 TABLET ORAL at 04:13

## 2024-03-27 ASSESSMENT — PAIN DESCRIPTION - PAIN TYPE
TYPE: ACUTE PAIN

## 2024-03-27 NOTE — CARE PLAN
Problem: Knowledge Deficit - Standard  Goal: Patient and family/care givers will demonstrate understanding of plan of care, disease process/condition, diagnostic tests and medications  Description: Target End Date:  1-3 days or as soon as patient condition allows    Document in Patient Education    1.  Patient and family/caregiver oriented to unit, equipment, visitation policy and means for communicating concern  2.  Complete/review Learning Assessment  3.  Assess knowledge level of disease process/condition, treatment plan, diagnostic tests and medications  4.  Explain disease process/condition, treatment plan, diagnostic tests and medications  Outcome: Progressing   The patient is Stable - Low risk of patient condition declining or worsening    Shift Goals  Clinical Goals: due to void, hydration, rest  Patient Goals: pain control, rest  Family Goals: HANY - no family present    Progress made toward(s) clinical / shift goals:  plan of care discussed with the pt & verbalized understanding, instructions on call bell provided & verbalized understanding, call bell within reach, frame alarm is on.

## 2024-03-27 NOTE — PROGRESS NOTES
Elevated blood pressure. APRN notified. APRN gave a verbal order to administer another 10mg of hydralazine.

## 2024-03-27 NOTE — PROGRESS NOTES
"  DATE: 3/27/2024    Post operative day 2 exploratory laparotomy, reduction of herniated bowel, closure of vaginal cuff (Dr. Boss), bowel resection and anastomosis.    INTERVAL EVENTS:  Doing ok, very hungry, adequate pain control, no flatus yet.    PHYSICAL EXAMINATION:  Vital Signs: BP (!) 185/80   Pulse 78   Temp 36.1 °C (97 °F) (Temporal)   Resp 18   Ht 1.651 m (5' 5\")   Wt 74.2 kg (163 lb 9.3 oz)   SpO2 93%     Midline incision dressed. Abdomen mildly distended and tender.    LABORATORY VALUES:  Recent Labs     03/25/24  0307 03/26/24  0518 03/27/24  0239   WBC 18.2* 11.1* 13.2*   RBC 4.66 4.18* 3.80*   HEMOGLOBIN 14.3 12.7 11.6*   HEMATOCRIT 43.7 39.3 35.9*   MCV 93.8 94.0 94.5   MCH 30.7 30.4 30.5   MCHC 32.7 32.3 32.3   RDW 43.4 46.4 47.2   PLATELETCT 370 283 297   MPV 10.6 10.4 10.3     Recent Labs     03/25/24  1003 03/26/24  0518 03/27/24  0239   SODIUM 140 142 142   POTASSIUM 5.2 5.1 4.3   CHLORIDE 114* 114* 114*   CO2 17* 17* 16*   GLUCOSE 209* 143* 144*   BUN 29* 31* 28*   CREATININE 1.03 1.21 1.11   CALCIUM 8.5 8.2* 8.4*     Recent Labs     03/25/24  0307 03/26/24  0518   ASTSGOT 9* 14   ALTSGPT 14 8   TBILIRUBIN 0.2 <0.2   ALKPHOSPHAT 89 62   GLOBULIN 2.7 2.4         ASSESSMENT AND PLAN:  * Prolapse of small intestine- (present on admission)  Assessment & Plan  Hx of hysterectomy 40 years ago.  Had BM and felt bowel come out 3/24 AM.  Seen in [place] and transferred to [place].  Large volume evisceration of small bowel coming through perineum upon arrival.  3/25 Exploratory celiotomy & repair of vaginal cuff dehiscence.  Dr. Boss, gynecology oncology.  [facility].    Hypertension- (present on admission)  Assessment & Plan  Chronic condition treated with atenolol, amlodipine & benazepril.  3/26 Resumed atenolol & amlodipine.  Continue to hold benazepril & monitor K+.    Type 2 diabetes mellitus (HCC)- (present on admission)  Assessment & Plan  Chronic condition treated with " metformin.  Holding maintenance metformin.  Insulin sliding scale coverage.  Plan to resumed upon discharge.    Hyperkalemia- (present on admission)  Assessment & Plan  ACE inhibitor held upon admission.  Monitor.    No contraindication to deep vein thrombosis (DVT) prophylaxis- (present on admission)  Assessment & Plan  3/26 Prophylactic dose enoxaparin 40 mg QD initiated.     Hyperlipidemia- (present on admission)  Assessment & Plan  Chronic condition treated with lovastatin.  3/26 Resumed maintenance medication.    Sips of clears.  Mobilize.  Awaiting return of bowel function.       ____________________________________     QUIN Whipple    DD: 3/27/2024  1:05 PM

## 2024-03-27 NOTE — PROGRESS NOTES
Gynecologic Oncology Progress Note    Author: Lo Austin P.A.-C.    Date/Time: 3/26/2024 5:30 PM    Date of Admit: 3/25/2024    HD#: 1 POD#: 1    Interval History:  Doing well this AM, pain well controlled. No nausea or vomiting. No flatus. No fever, chills, CP or SOB.          Review of Systems   Constitutional:  Positive for malaise/fatigue. Negative for chills and fever.   Respiratory:  Negative for cough and shortness of breath.    Cardiovascular:  Negative for chest pain and leg swelling.   Gastrointestinal:  Positive for abdominal pain. Negative for nausea and vomiting.   Genitourinary:  Negative for dysuria, frequency and urgency.            No Known Allergies         Current Facility-Administered Medications:     atenolol (Tenormin) tablet 25 mg, 25 mg, Oral, Q DAY, Alaina Virgen, A.P.R.N., 25 mg at 03/26/24 1150    amLODIPine (Norvasc) tablet 10 mg, 10 mg, Oral, DAILY, Alaina Perkinsung, A.P.R.N., 10 mg at 03/26/24 1150    lovastatin (Mevacor) tablet 40 mg, 40 mg, Oral, DAILY, Alaina CHEW Zehrung, A.P.R.N., 40 mg at 03/26/24 1241    hydrALAZINE (Apresoline) injection 10 mg, 10 mg, Intravenous, Q4HRS PRN, Alaina CHEW Zehrung, A.P.R.N., 10 mg at 03/26/24 1626    nystatin (Mycostatin) powder, , Topical, BID, Lorrie Harris M.D.    NS infusion, , Intravenous, Continuous, Berenice Kraft A.P.R.N., Last Rate: 125 mL/hr at 03/26/24 0904, New Bag at 03/26/24 0904    enoxaparin (Lovenox) inj 40 mg, 40 mg, Subcutaneous, DAILY AT 1800, Ernst Holder M.D.    senna-docusate (Pericolace Or Senokot S) 8.6-50 MG per tablet 2 Tablet, 2 Tablet, Oral, Q EVENING **AND** polyethylene glycol/lytes (Miralax) Packet 1 Packet, 1 Packet, Oral, QDAY Ernst HSU M.D.    Notify provider if pain remains uncontrolled, , , CONTINUOUS **AND** Use the Numeric Rating Scale (NRS), Fregoso-Baker Faces (WBF), or FLACC on regular floors and Critical-Care Pain Observation Tool (CPOT) on ICUs/Trauma to assess pain, ,  ", CONTINUOUS **AND** Pulse Ox, , , CONTINUOUS **AND** Pharmacy Consult Request ...Pain Management Review 1 Each, 1 Each, Other, PHARMACY TO DOSE **AND** If patient difficult to arouse and/or has respiratory depression (respiratory rate of 10 or less), stop any opiates that are currently infusing and call a Rapid Response., , , CONTINUOUS, Ernst Holder M.D.    oxyCODONE immediate-release (Roxicodone) tablet 2.5 mg, 2.5 mg, Oral, Q3HRS PRN **OR** oxyCODONE immediate-release (Roxicodone) tablet 5 mg, 5 mg, Oral, Q3HRS PRN, 5 mg at 03/26/24 1627 **OR** morphine 4 MG/ML injection 2 mg, 2 mg, Intravenous, Q3HRS PRN, Ernst Holder M.D., 2 mg at 03/26/24 0858    ondansetron (Zofran) syringe/vial injection 4 mg, 4 mg, Intravenous, Q4HRS PRN, Ernst Holder M.D., 4 mg at 03/25/24 0513    ondansetron (Zofran ODT) dispertab 4 mg, 4 mg, Oral, Q4HRS PRN, Ernst Holder M.D.    insulin regular (HumuLIN R,NovoLIN R) injection, 2-9 Units, Subcutaneous, Q6HRS, 2 Units at 03/26/24 0011 **AND** POC blood glucose manual result, , , Q6H **AND** NOTIFY MD and PharmD, , , Once **AND** Administer 20 grams of glucose (approximately 8 ounces of fruit juice) every 15 minutes PRN FSBG less than 70 mg/dL, , , PRN **AND** dextrose 10 % BOLUS 25 g, 25 g, Intravenous, Q15 MIN PRN, Ernst Holder M.D.    micafungin (Mycamine) 100 mg in dextrose 5% 100 mL ivpb, 100 mg, Intravenous, Q24HRS, Lorrie Harris M.D., Stopped at 03/26/24 0607    [COMPLETED] piperacillin-tazobactam (Zosyn) 4.5 g in  mL IVPB, 4.5 g, Intravenous, Once, Stopped at 03/25/24 1334 **AND** piperacillin-tazobactam (Zosyn) 4.5 g in  mL IVPB, 4.5 g, Intravenous, Q8HRS, Lorrie Harris M.D., Stopped at 03/26/24 1648       Objective:     BP (!) 183/69   Pulse 66   Temp 36.6 °C (97.9 °F) (Temporal)   Resp 18   Ht 1.651 m (5' 5\")   Wt 74.2 kg (163 lb 9.3 oz)   SpO2 96%     Physical Exam  Constitutional:       General: She is not in acute " distress.  HENT:      Head: Normocephalic.      Mouth/Throat:      Mouth: Mucous membranes are moist.   Cardiovascular:      Rate and Rhythm: Normal rate and regular rhythm.      Pulses: Normal pulses.   Pulmonary:      Effort: Pulmonary effort is normal. No respiratory distress.   Abdominal:      General: Abdomen is flat. There is no distension.      Palpations: Abdomen is soft.      Comments: Midline incision with dressing c/d/I, abdomen appropriately tender.    Musculoskeletal:         General: Normal range of motion.      Right lower leg: No edema.      Left lower leg: No edema.   Neurological:      Mental Status: She is alert and oriented to person, place, and time.              Recent Labs     03/25/24  0307 03/26/24  0518   WBC 18.2* 11.1*   RBC 4.66 4.18*   HEMOGLOBIN 14.3 12.7   HEMATOCRIT 43.7 39.3   MCV 93.8 94.0   MCH 30.7 30.4   MCHC 32.7 32.3   RDW 43.4 46.4   PLATELETCT 370 283   MPV 10.6 10.4     Recent Labs     03/25/24  0649 03/25/24  1003 03/26/24  0518   SODIUM 139 140 142   POTASSIUM 5.9* 5.2 5.1   CHLORIDE 109 114* 114*   CO2 19* 17* 17*   GLUCOSE 250* 209* 143*   BUN 30* 29* 31*   CREATININE 0.97 1.03 1.21   CALCIUM 8.5 8.5 8.2*     Recent Labs     03/25/24  0307 03/26/24  0518   ASTSGOT 9* 14   ALTSGPT 14 8   TBILIRUBIN 0.2 <0.2   ALKPHOSPHAT 89 62   GLOBULIN 2.7 2.4               Assessment and Plan:  73 yo admitted for vaginal cuff dehiscence and small bowel evisceration, s/p exploratory laparotomy, celiotomy and vaginal cuff repair:     POD#1: doing well, post op care per Dr. Harris. Reviewed post op restriction including no lifting over 10 lbs, nothing per vagina, no bathing or swimming. She will need cuff check to ensure healing in 6 weeks as outpatient, will discuss options for this as patient lives in [place], [place]       This case was discussed with Dr. Gera Austin, P.A.-C.  Gynecology Oncology  Center [facility]

## 2024-03-28 ENCOUNTER — APPOINTMENT (OUTPATIENT)
Dept: RADIOLOGY | Facility: MEDICAL CENTER | Age: 74
DRG: 746 | End: 2024-03-28
Attending: NURSE PRACTITIONER
Payer: MEDICARE

## 2024-03-28 PROBLEM — K56.7 ILEUS FOLLOWING GASTROINTESTINAL SURGERY (HCC): Status: ACTIVE | Noted: 2024-03-28

## 2024-03-28 PROBLEM — K91.89 ILEUS FOLLOWING GASTROINTESTINAL SURGERY (HCC): Status: ACTIVE | Noted: 2024-03-28

## 2024-03-28 LAB
ANION GAP SERPL CALC-SCNC: 10 MMOL/L (ref 7–16)
BASOPHILS # BLD AUTO: 0.3 % (ref 0–1.8)
BASOPHILS # BLD: 0.04 K/UL (ref 0–0.12)
BUN SERPL-MCNC: 30 MG/DL (ref 8–22)
CALCIUM SERPL-MCNC: 8.3 MG/DL (ref 8.5–10.5)
CHLORIDE SERPL-SCNC: 114 MMOL/L (ref 96–112)
CO2 SERPL-SCNC: 19 MMOL/L (ref 20–33)
CREAT SERPL-MCNC: 0.89 MG/DL (ref 0.5–1.4)
EOSINOPHIL # BLD AUTO: 0 K/UL (ref 0–0.51)
EOSINOPHIL NFR BLD: 0 % (ref 0–6.9)
ERYTHROCYTE [DISTWIDTH] IN BLOOD BY AUTOMATED COUNT: 46.4 FL (ref 35.9–50)
GFR SERPLBLD CREATININE-BSD FMLA CKD-EPI: 68 ML/MIN/1.73 M 2
GLUCOSE BLD STRIP.AUTO-MCNC: 143 MG/DL (ref 65–99)
GLUCOSE BLD STRIP.AUTO-MCNC: 192 MG/DL (ref 65–99)
GLUCOSE BLD STRIP.AUTO-MCNC: 196 MG/DL (ref 65–99)
GLUCOSE BLD STRIP.AUTO-MCNC: 216 MG/DL (ref 65–99)
GLUCOSE BLD STRIP.AUTO-MCNC: 224 MG/DL (ref 65–99)
GLUCOSE SERPL-MCNC: 216 MG/DL (ref 65–99)
HCT VFR BLD AUTO: 36.3 % (ref 37–47)
HGB BLD-MCNC: 11.9 G/DL (ref 12–16)
IMM GRANULOCYTES # BLD AUTO: 0.11 K/UL (ref 0–0.11)
IMM GRANULOCYTES NFR BLD AUTO: 0.8 % (ref 0–0.9)
LYMPHOCYTES # BLD AUTO: 0.86 K/UL (ref 1–4.8)
LYMPHOCYTES NFR BLD: 6.2 % (ref 22–41)
MCH RBC QN AUTO: 30.1 PG (ref 27–33)
MCHC RBC AUTO-ENTMCNC: 32.8 G/DL (ref 32.2–35.5)
MCV RBC AUTO: 91.9 FL (ref 81.4–97.8)
MONOCYTES # BLD AUTO: 0.67 K/UL (ref 0–0.85)
MONOCYTES NFR BLD AUTO: 4.8 % (ref 0–13.4)
NEUTROPHILS # BLD AUTO: 12.16 K/UL (ref 1.82–7.42)
NEUTROPHILS NFR BLD: 87.9 % (ref 44–72)
NRBC # BLD AUTO: 0 K/UL
NRBC BLD-RTO: 0 /100 WBC (ref 0–0.2)
PLATELET # BLD AUTO: 359 K/UL (ref 164–446)
PMV BLD AUTO: 10.1 FL (ref 9–12.9)
POTASSIUM SERPL-SCNC: 4 MMOL/L (ref 3.6–5.5)
RBC # BLD AUTO: 3.95 M/UL (ref 4.2–5.4)
SODIUM SERPL-SCNC: 143 MMOL/L (ref 135–145)
WBC # BLD AUTO: 13.8 K/UL (ref 4.8–10.8)

## 2024-03-28 PROCEDURE — 700102 HCHG RX REV CODE 250 W/ 637 OVERRIDE(OP)

## 2024-03-28 PROCEDURE — 700111 HCHG RX REV CODE 636 W/ 250 OVERRIDE (IP): Performed by: NURSE PRACTITIONER

## 2024-03-28 PROCEDURE — 80048 BASIC METABOLIC PNL TOTAL CA: CPT

## 2024-03-28 PROCEDURE — 82962 GLUCOSE BLOOD TEST: CPT | Mod: 91

## 2024-03-28 PROCEDURE — 85025 COMPLETE CBC W/AUTO DIFF WBC: CPT

## 2024-03-28 PROCEDURE — 770001 HCHG ROOM/CARE - MED/SURG/GYN PRIV*

## 2024-03-28 PROCEDURE — 71045 X-RAY EXAM CHEST 1 VIEW: CPT

## 2024-03-28 PROCEDURE — 700102 HCHG RX REV CODE 250 W/ 637 OVERRIDE(OP): Performed by: INTERNAL MEDICINE

## 2024-03-28 PROCEDURE — 74018 RADEX ABDOMEN 1 VIEW: CPT

## 2024-03-28 PROCEDURE — 700111 HCHG RX REV CODE 636 W/ 250 OVERRIDE (IP): Performed by: SURGERY

## 2024-03-28 PROCEDURE — A9270 NON-COVERED ITEM OR SERVICE: HCPCS

## 2024-03-28 PROCEDURE — 36415 COLL VENOUS BLD VENIPUNCTURE: CPT

## 2024-03-28 PROCEDURE — 700105 HCHG RX REV CODE 258: Performed by: NURSE PRACTITIONER

## 2024-03-28 PROCEDURE — A9270 NON-COVERED ITEM OR SERVICE: HCPCS | Performed by: INTERNAL MEDICINE

## 2024-03-28 PROCEDURE — 99024 POSTOP FOLLOW-UP VISIT: CPT | Performed by: NURSE PRACTITIONER

## 2024-03-28 PROCEDURE — 700111 HCHG RX REV CODE 636 W/ 250 OVERRIDE (IP): Mod: JZ | Performed by: INTERNAL MEDICINE

## 2024-03-28 PROCEDURE — 700105 HCHG RX REV CODE 258: Performed by: SURGERY

## 2024-03-28 PROCEDURE — 700105 HCHG RX REV CODE 258

## 2024-03-28 PROCEDURE — 700111 HCHG RX REV CODE 636 W/ 250 OVERRIDE (IP)

## 2024-03-28 RX ORDER — PROCHLORPERAZINE EDISYLATE 5 MG/ML
5 INJECTION INTRAMUSCULAR; INTRAVENOUS EVERY 6 HOURS PRN
Status: DISCONTINUED | OUTPATIENT
Start: 2024-03-28 | End: 2024-04-02 | Stop reason: HOSPADM

## 2024-03-28 RX ADMIN — AMLODIPINE BESYLATE 10 MG: 10 TABLET ORAL at 04:05

## 2024-03-28 RX ADMIN — ATENOLOL 25 MG: 25 TABLET ORAL at 04:05

## 2024-03-28 RX ADMIN — MICAFUNGIN SODIUM 100 MG: 100 INJECTION, POWDER, LYOPHILIZED, FOR SOLUTION INTRAVENOUS at 04:05

## 2024-03-28 RX ADMIN — LOVASTATIN 40 MG: 20 TABLET ORAL at 04:05

## 2024-03-28 RX ADMIN — HYDRALAZINE HYDROCHLORIDE 10 MG: 20 INJECTION INTRAMUSCULAR; INTRAVENOUS at 15:54

## 2024-03-28 RX ADMIN — PIPERACILLIN AND TAZOBACTAM 4.5 G: 4; .5 INJECTION, POWDER, FOR SOLUTION INTRAVENOUS at 13:48

## 2024-03-28 RX ADMIN — NYSTATIN: 100000 POWDER TOPICAL at 04:05

## 2024-03-28 RX ADMIN — INSULIN HUMAN 2 UNITS: 100 INJECTION, SOLUTION PARENTERAL at 00:08

## 2024-03-28 RX ADMIN — OXYCODONE 5 MG: 5 TABLET ORAL at 23:18

## 2024-03-28 RX ADMIN — INSULIN HUMAN 3 UNITS: 100 INJECTION, SOLUTION PARENTERAL at 18:22

## 2024-03-28 RX ADMIN — SODIUM CHLORIDE: 9 INJECTION, SOLUTION INTRAVENOUS at 03:01

## 2024-03-28 RX ADMIN — OXYCODONE 5 MG: 5 TABLET ORAL at 20:14

## 2024-03-28 RX ADMIN — OXYCODONE 5 MG: 5 TABLET ORAL at 04:08

## 2024-03-28 RX ADMIN — PIPERACILLIN AND TAZOBACTAM 4.5 G: 4; .5 INJECTION, POWDER, FOR SOLUTION INTRAVENOUS at 04:03

## 2024-03-28 RX ADMIN — SODIUM CHLORIDE: 9 INJECTION, SOLUTION INTRAVENOUS at 23:16

## 2024-03-28 RX ADMIN — INSULIN HUMAN 2 UNITS: 100 INJECTION, SOLUTION PARENTERAL at 04:11

## 2024-03-28 RX ADMIN — PROCHLORPERAZINE EDISYLATE 5 MG: 5 INJECTION INTRAMUSCULAR; INTRAVENOUS at 08:39

## 2024-03-28 RX ADMIN — NYSTATIN: 100000 POWDER TOPICAL at 18:32

## 2024-03-28 RX ADMIN — INSULIN HUMAN 3 UNITS: 100 INJECTION, SOLUTION PARENTERAL at 13:52

## 2024-03-28 RX ADMIN — PIPERACILLIN AND TAZOBACTAM 4.5 G: 4; .5 INJECTION, POWDER, FOR SOLUTION INTRAVENOUS at 20:15

## 2024-03-28 RX ADMIN — ONDANSETRON 4 MG: 2 INJECTION INTRAMUSCULAR; INTRAVENOUS at 02:56

## 2024-03-28 RX ADMIN — ENOXAPARIN SODIUM 40 MG: 100 INJECTION SUBCUTANEOUS at 18:22

## 2024-03-28 RX ADMIN — ONDANSETRON 4 MG: 2 INJECTION INTRAMUSCULAR; INTRAVENOUS at 06:42

## 2024-03-28 ASSESSMENT — PAIN DESCRIPTION - PAIN TYPE
TYPE: ACUTE PAIN;SURGICAL PAIN
TYPE: ACUTE PAIN;SURGICAL PAIN
TYPE: ACUTE PAIN
TYPE: ACUTE PAIN;SURGICAL PAIN

## 2024-03-28 NOTE — PROGRESS NOTES
Assumed care of patient at 0645. Bedside report received. Assessment complete.  AA&Ox4. Denies CP/SOB. Room air  Reporting  pain controlled at this time. Declined intervention at this time.  Educated patient regarding pharmacologic and non pharmacologic modalities for pain management.  Skin per flow sheets. MLI staples and open to air.   Not tolerating diet. Reports nausea. Orders for additional PRN nausea meds received   + void. +last BM PTA  Pt ambulates SBA  Fall prevention measures in place per flowsheets.  SCD's in usem ambulating frequently, Pharmacologic VTE prophylaxis in use. Discussed ambulation plan with patient today, patient agreeable.   Plan of care discussed, all questions answered.  Educated regarding importance of oral care. Oral care kit at bedside. Call light is within reach, treaded slipper socks on, bed in lowest/ locked position, hourly rounding in place, all needs met at this time.

## 2024-03-28 NOTE — CARE PLAN
Problem: Knowledge Deficit - Standard  Goal: Patient and family/care givers will demonstrate understanding of plan of care, disease process/condition, diagnostic tests and medications  Description: Target End Date:  1-3 days or as soon as patient condition allows    Document in Patient Education    1.  Patient and family/caregiver oriented to unit, equipment, visitation policy and means for communicating concern  2.  Complete/review Learning Assessment  3.  Assess knowledge level of disease process/condition, treatment plan, diagnostic tests and medications  4.  Explain disease process/condition, treatment plan, diagnostic tests and medications  Outcome: Progressing   The patient is Stable - Low risk of patient condition declining or worsening    Shift Goals  Clinical Goals: monitor BP, pain control, rest  Patient Goals: rest  Family Goals: HANY - no family present    Progress made toward(s) clinical / shift goals:  plan of care discussed with the patient, instructions on call bell use provided and verbalized understanding.

## 2024-03-28 NOTE — PROGRESS NOTES
"  DATE: 3/28/2024    Post operative day 3 exploratory laparotomy, reduction of herniated bowel, closure of vaginal cuff (Dr. Boss), bowel resection and anastomosis.     INTERVAL EVENTS:  Some N/V overnight, better now. No flatus.      PHYSICAL EXAMINATION:  Vital Signs: BP (!) 163/77   Pulse 76   Temp 36.6 °C (97.9 °F) (Temporal)   Resp 17   Ht 1.651 m (5' 5\")   Wt 74.2 kg (163 lb 9.3 oz)   SpO2 93%     Midline incision approximated with staples. Abdomen distended and tender.    LABORATORY VALUES:  Recent Labs     03/26/24 0518 03/27/24  0239 03/28/24  0202   WBC 11.1* 13.2* 13.8*   RBC 4.18* 3.80* 3.95*   HEMOGLOBIN 12.7 11.6* 11.9*   HEMATOCRIT 39.3 35.9* 36.3*   MCV 94.0 94.5 91.9   MCH 30.4 30.5 30.1   MCHC 32.3 32.3 32.8   RDW 46.4 47.2 46.4   PLATELETCT 283 297 359   MPV 10.4 10.3 10.1     Recent Labs     03/26/24 0518 03/27/24  0239 03/28/24  0202   SODIUM 142 142 143   POTASSIUM 5.1 4.3 4.0   CHLORIDE 114* 114* 114*   CO2 17* 16* 19*   GLUCOSE 143* 144* 216*   BUN 31* 28* 30*   CREATININE 1.21 1.11 0.89   CALCIUM 8.2* 8.4* 8.3*     Recent Labs     03/26/24 0518   ASTSGOT 14   ALTSGPT 8   TBILIRUBIN <0.2   ALKPHOSPHAT 62   GLOBULIN 2.4       ASSESSMENT AND PLAN:  * Prolapse of small intestine- (present on admission)  Assessment & Plan  Hx of hysterectomy 40 years ago.  Had BM and felt bowel come out 3/24 AM.  Seen in [place] and transferred to [place].  Large volume evisceration of small bowel coming through perineum upon arrival.  3/25 Exploratory celiotomy & repair of vaginal cuff dehiscence.  Dr. Boss, gynecology oncology.  [facility].    Hypertension- (present on admission)  Assessment & Plan  Chronic condition treated with atenolol, amlodipine & benazepril.  3/26 Resumed atenolol & amlodipine.  Continue to hold benazepril & monitor K+.    Type 2 diabetes mellitus (HCC)- (present on admission)  Assessment & Plan  Chronic condition treated with metformin.  Holding maintenance metformin.  " Insulin sliding scale coverage.  Plan to resumed upon discharge.    Hyperkalemia- (present on admission)  Assessment & Plan  ACE inhibitor held upon admission.  Monitor.    No contraindication to deep vein thrombosis (DVT) prophylaxis- (present on admission)  Assessment & Plan  3/26 Prophylactic dose enoxaparin 40 mg QD initiated.     Hyperlipidemia- (present on admission)  Assessment & Plan  Chronic condition treated with lovastatin.  3/26 Resumed maintenance medication.      - Sips of clears.  - Mobilize.  - For any vomiting make NPO and notify [facility].     ____________________________________     QUIN Whipple    DD: 3/28/2024  7:48 AM

## 2024-03-28 NOTE — CARE PLAN
The patient is Stable - Low risk of patient condition declining or worsening    Shift Goals  Clinical Goals: rest, pain management, BP control  Patient Goals: comfort, rest  Family Goals: HANY - no family present    Progress made toward(s) clinical / shift goals:      Problem: Knowledge Deficit - Standard  Goal: Patient and family/care givers will demonstrate understanding of plan of care, disease process/condition, diagnostic tests and medications  Outcome: Progressing     Problem: Pain - Standard  Goal: Alleviation of pain or a reduction in pain to the patient’s comfort goal  Outcome: Progressing     Patient has call light within reach and calls appropriately. She has been updated on plan of care and will continue to be updated as information arises. She has had a tolerable pain level this shift, controlled with medication (See MAR), rest, and repositioning.

## 2024-03-29 LAB
ANION GAP SERPL CALC-SCNC: 12 MMOL/L (ref 7–16)
BASOPHILS # BLD AUTO: 0.2 % (ref 0–1.8)
BASOPHILS # BLD: 0.02 K/UL (ref 0–0.12)
BUN SERPL-MCNC: 34 MG/DL (ref 8–22)
CALCIUM SERPL-MCNC: 8.3 MG/DL (ref 8.5–10.5)
CHLORIDE SERPL-SCNC: 114 MMOL/L (ref 96–112)
CO2 SERPL-SCNC: 27 MMOL/L (ref 20–33)
CREAT SERPL-MCNC: 0.89 MG/DL (ref 0.5–1.4)
EOSINOPHIL # BLD AUTO: 0.01 K/UL (ref 0–0.51)
EOSINOPHIL NFR BLD: 0.1 % (ref 0–6.9)
ERYTHROCYTE [DISTWIDTH] IN BLOOD BY AUTOMATED COUNT: 46 FL (ref 35.9–50)
GFR SERPLBLD CREATININE-BSD FMLA CKD-EPI: 68 ML/MIN/1.73 M 2
GLUCOSE BLD STRIP.AUTO-MCNC: 119 MG/DL (ref 65–99)
GLUCOSE BLD STRIP.AUTO-MCNC: 127 MG/DL (ref 65–99)
GLUCOSE BLD STRIP.AUTO-MCNC: 149 MG/DL (ref 65–99)
GLUCOSE BLD STRIP.AUTO-MCNC: 155 MG/DL (ref 65–99)
GLUCOSE SERPL-MCNC: 155 MG/DL (ref 65–99)
HCT VFR BLD AUTO: 34.5 % (ref 37–47)
HGB BLD-MCNC: 11.4 G/DL (ref 12–16)
IMM GRANULOCYTES # BLD AUTO: 0.26 K/UL (ref 0–0.11)
IMM GRANULOCYTES NFR BLD AUTO: 2.4 % (ref 0–0.9)
LYMPHOCYTES # BLD AUTO: 1.01 K/UL (ref 1–4.8)
LYMPHOCYTES NFR BLD: 9.1 % (ref 22–41)
MCH RBC QN AUTO: 30 PG (ref 27–33)
MCHC RBC AUTO-ENTMCNC: 33 G/DL (ref 32.2–35.5)
MCV RBC AUTO: 90.8 FL (ref 81.4–97.8)
MONOCYTES # BLD AUTO: 0.75 K/UL (ref 0–0.85)
MONOCYTES NFR BLD AUTO: 6.8 % (ref 0–13.4)
NEUTROPHILS # BLD AUTO: 9.01 K/UL (ref 1.82–7.42)
NEUTROPHILS NFR BLD: 81.4 % (ref 44–72)
NRBC # BLD AUTO: 0 K/UL
NRBC BLD-RTO: 0 /100 WBC (ref 0–0.2)
PLATELET # BLD AUTO: 331 K/UL (ref 164–446)
PMV BLD AUTO: 10 FL (ref 9–12.9)
POTASSIUM SERPL-SCNC: 3.1 MMOL/L (ref 3.6–5.5)
RBC # BLD AUTO: 3.8 M/UL (ref 4.2–5.4)
SODIUM SERPL-SCNC: 153 MMOL/L (ref 135–145)
WBC # BLD AUTO: 11.1 K/UL (ref 4.8–10.8)

## 2024-03-29 PROCEDURE — 700101 HCHG RX REV CODE 250: Performed by: NURSE PRACTITIONER

## 2024-03-29 PROCEDURE — 85025 COMPLETE CBC W/AUTO DIFF WBC: CPT

## 2024-03-29 PROCEDURE — 700105 HCHG RX REV CODE 258: Performed by: SURGERY

## 2024-03-29 PROCEDURE — 700102 HCHG RX REV CODE 250 W/ 637 OVERRIDE(OP): Performed by: NURSE PRACTITIONER

## 2024-03-29 PROCEDURE — 700111 HCHG RX REV CODE 636 W/ 250 OVERRIDE (IP): Mod: JZ | Performed by: SURGERY

## 2024-03-29 PROCEDURE — 700102 HCHG RX REV CODE 250 W/ 637 OVERRIDE(OP)

## 2024-03-29 PROCEDURE — 36415 COLL VENOUS BLD VENIPUNCTURE: CPT

## 2024-03-29 PROCEDURE — 700102 HCHG RX REV CODE 250 W/ 637 OVERRIDE(OP): Performed by: SURGERY

## 2024-03-29 PROCEDURE — A9270 NON-COVERED ITEM OR SERVICE: HCPCS | Performed by: NURSE PRACTITIONER

## 2024-03-29 PROCEDURE — 99024 POSTOP FOLLOW-UP VISIT: CPT | Performed by: NURSE PRACTITIONER

## 2024-03-29 PROCEDURE — 700111 HCHG RX REV CODE 636 W/ 250 OVERRIDE (IP): Mod: JZ | Performed by: INTERNAL MEDICINE

## 2024-03-29 PROCEDURE — A9270 NON-COVERED ITEM OR SERVICE: HCPCS | Performed by: SURGERY

## 2024-03-29 PROCEDURE — 700102 HCHG RX REV CODE 250 W/ 637 OVERRIDE(OP): Performed by: INTERNAL MEDICINE

## 2024-03-29 PROCEDURE — 80048 BASIC METABOLIC PNL TOTAL CA: CPT

## 2024-03-29 PROCEDURE — A9270 NON-COVERED ITEM OR SERVICE: HCPCS

## 2024-03-29 PROCEDURE — 770001 HCHG ROOM/CARE - MED/SURG/GYN PRIV*

## 2024-03-29 PROCEDURE — A9270 NON-COVERED ITEM OR SERVICE: HCPCS | Performed by: INTERNAL MEDICINE

## 2024-03-29 PROCEDURE — 700111 HCHG RX REV CODE 636 W/ 250 OVERRIDE (IP)

## 2024-03-29 PROCEDURE — 82962 GLUCOSE BLOOD TEST: CPT

## 2024-03-29 RX ORDER — SODIUM CHLORIDE AND POTASSIUM CHLORIDE 150; 450 MG/100ML; MG/100ML
INJECTION, SOLUTION INTRAVENOUS CONTINUOUS
Status: DISCONTINUED | OUTPATIENT
Start: 2024-03-29 | End: 2024-04-02

## 2024-03-29 RX ORDER — BENAZEPRIL HYDROCHLORIDE 20 MG/1
40 TABLET ORAL DAILY
Status: DISCONTINUED | OUTPATIENT
Start: 2024-03-29 | End: 2024-04-02 | Stop reason: HOSPADM

## 2024-03-29 RX ORDER — SODIUM CHLORIDE, SODIUM LACTATE, POTASSIUM CHLORIDE, AND CALCIUM CHLORIDE .6; .31; .03; .02 G/100ML; G/100ML; G/100ML; G/100ML
1000 INJECTION, SOLUTION INTRAVENOUS ONCE
Status: COMPLETED | OUTPATIENT
Start: 2024-03-29 | End: 2024-03-29

## 2024-03-29 RX ADMIN — LOVASTATIN 40 MG: 20 TABLET ORAL at 04:32

## 2024-03-29 RX ADMIN — NYSTATIN: 100000 POWDER TOPICAL at 16:33

## 2024-03-29 RX ADMIN — NYSTATIN 1 APPLICATION: 100000 POWDER TOPICAL at 04:23

## 2024-03-29 RX ADMIN — BENAZEPRIL HYDROCHLORIDE 40 MG: 20 TABLET ORAL at 09:37

## 2024-03-29 RX ADMIN — PIPERACILLIN AND TAZOBACTAM 4.5 G: 4; .5 INJECTION, POWDER, FOR SOLUTION INTRAVENOUS at 04:19

## 2024-03-29 RX ADMIN — SODIUM CHLORIDE, POTASSIUM CHLORIDE, SODIUM LACTATE AND CALCIUM CHLORIDE 1000 ML: 600; 310; 30; 20 INJECTION, SOLUTION INTRAVENOUS at 09:37

## 2024-03-29 RX ADMIN — OXYCODONE 5 MG: 5 TABLET ORAL at 21:44

## 2024-03-29 RX ADMIN — OXYCODONE 5 MG: 5 TABLET ORAL at 18:50

## 2024-03-29 RX ADMIN — POTASSIUM CHLORIDE AND SODIUM CHLORIDE: 450; 150 INJECTION, SOLUTION INTRAVENOUS at 21:43

## 2024-03-29 RX ADMIN — HYDRALAZINE HYDROCHLORIDE 10 MG: 20 INJECTION INTRAMUSCULAR; INTRAVENOUS at 15:35

## 2024-03-29 RX ADMIN — SENNOSIDES AND DOCUSATE SODIUM 2 TABLET: 50; 8.6 TABLET ORAL at 16:34

## 2024-03-29 RX ADMIN — INSULIN HUMAN 2 UNITS: 100 INJECTION, SOLUTION PARENTERAL at 16:38

## 2024-03-29 RX ADMIN — ATENOLOL 25 MG: 25 TABLET ORAL at 04:22

## 2024-03-29 RX ADMIN — POTASSIUM CHLORIDE AND SODIUM CHLORIDE: 450; 150 INJECTION, SOLUTION INTRAVENOUS at 09:46

## 2024-03-29 RX ADMIN — ENOXAPARIN SODIUM 40 MG: 100 INJECTION SUBCUTANEOUS at 16:33

## 2024-03-29 RX ADMIN — OXYCODONE 5 MG: 5 TABLET ORAL at 09:43

## 2024-03-29 RX ADMIN — AMLODIPINE BESYLATE 10 MG: 10 TABLET ORAL at 04:32

## 2024-03-29 ASSESSMENT — PAIN DESCRIPTION - PAIN TYPE
TYPE: ACUTE PAIN
TYPE: ACUTE PAIN
TYPE: ACUTE PAIN;SURGICAL PAIN
TYPE: ACUTE PAIN
TYPE: ACUTE PAIN;SURGICAL PAIN
TYPE: ACUTE PAIN

## 2024-03-29 ASSESSMENT — ENCOUNTER SYMPTOMS
FEVER: 0
CHILLS: 0
ABDOMINAL PAIN: 1
VOMITING: 0
SHORTNESS OF BREATH: 0
NAUSEA: 0
COUGH: 0

## 2024-03-29 NOTE — PROGRESS NOTES
"  DATE: 3/29/2024    Post operative day 4 exploratory laparotomy, reduction of herniated bowel, closure of vaginal cuff (Dr. Boss), bowel resection and anastomosis.     INTERVAL EVENTS:  Feeling better after gastric decompression, denies N/V, passing flatus.  Electrolytes noted. 4.9L NGT output in 24 hrs.  BP has been elevated.    PHYSICAL EXAMINATION:  Vital Signs: BP (!) 167/70   Pulse 67   Temp 36.5 °C (97.7 °F) (Temporal)   Resp 17   Ht 1.651 m (5' 5\")   Wt 74.2 kg (163 lb 9.3 oz)   SpO2 95%     Large bilious, clear output from NGT. Abdomen soft, mildly tender on right. Midline incision approximated with staples.    LABORATORY VALUES:  Recent Labs     03/27/24 0239 03/28/24  0202 03/29/24  0655   WBC 13.2* 13.8* 11.1*   RBC 3.80* 3.95* 3.80*   HEMOGLOBIN 11.6* 11.9* 11.4*   HEMATOCRIT 35.9* 36.3* 34.5*   MCV 94.5 91.9 90.8   MCH 30.5 30.1 30.0   MCHC 32.3 32.8 33.0   RDW 47.2 46.4 46.0   PLATELETCT 297 359 331   MPV 10.3 10.1 10.0     Recent Labs     03/27/24 0239 03/28/24  0202 03/29/24  0655   SODIUM 142 143 153*   POTASSIUM 4.3 4.0 3.1*   CHLORIDE 114* 114* 114*   CO2 16* 19* 27   GLUCOSE 144* 216* 155*   BUN 28* 30* 34*   CREATININE 1.11 0.89 0.89   CALCIUM 8.4* 8.3* 8.3*                IMAGING:  DX-ABDOMEN FOR TUBE PLACEMENT   Final Result      NG tube tip overlies the gastric antrum.      DX-CHEST-LIMITED (1 VIEW)   Final Result      1.  NG tube tip located just above the gastroesophageal junction. Advancement into the stomach is recommended.      2.  The patient's nurse was contacted regarding the tube position at 5:02 PM on 3/28/2024.      [id number] VIEW   Final Result      1. Small bowel ileus versus distal mechanical small bowel obstruction. Consider small bowel series for further evaluation.   2. Other chronic degenerative and post surgical changes.      OUTSIDE IMAGES-CT ABDOMEN /PELVIS   Final Result          ASSESSMENT AND PLAN:  * Prolapse of small intestine- (present on " admission)  Assessment & Plan  Hx of hysterectomy 40 years ago.  Had BM and felt bowel come out 3/24 AM.  Seen in [place] and transferred to [place].  Large volume evisceration of small bowel coming through perineum upon arrival.  3/25 Exploratory celiotomy & repair of vaginal cuff dehiscence.  Dr. Boss, gynecology oncology.  [facility].    Ileus following gastrointestinal surgery (HCC)- (present on admission)  Assessment & Plan  3/28 NPO/NGT to WS.  Awaiting return of bowel function.    Hypertension- (present on admission)  Assessment & Plan  Chronic condition treated with atenolol, amlodipine & benazepril.  3/26 Resumed atenolol & amlodipine.  3/29 Resumed benazepril.    Type 2 diabetes mellitus (HCC)- (present on admission)  Assessment & Plan  Chronic condition treated with metformin.  Holding maintenance metformin.  Insulin sliding scale coverage.  Plan to resumed upon discharge.    Hyperkalemia- (present on admission)  Assessment & Plan  ACE inhibitor held upon admission.  3/25 Resolved.  3/29 Resumed ACE. Monitor.    No contraindication to deep vein thrombosis (DVT) prophylaxis- (present on admission)  Assessment & Plan  3/26 Prophylactic dose enoxaparin 40 mg QD initiated.     Hyperlipidemia- (present on admission)  Assessment & Plan  Chronic condition treated with lovastatin.  3/26 Resumed maintenance medication.    - LR bolus now.  - Change IVF to 1/2NS with 20 KCL at 100 ml/hr.  - Benazepril resumed.  - Labs in AM.  - Continue NGT to WS, may clamp for mobilization and hygiene.  - Ok for shower.     ____________________________________     QUIN Whipple    DD: 3/29/2024  8:39 AM

## 2024-03-29 NOTE — PROGRESS NOTES
Gynecologic Oncology Progress Note    Author: Lo Austin P.A.-C.    Date/Time: 3/29/2024 12:33 PM    Date of Admit: 3/25/2024    HD#: 4 POD#: 1    Interval History:  Doing well this AM. Now with NG to LIS, feeling better s/p placement. Pain controlled. Denies vaginal bleeding or discharge.          Review of Systems   Constitutional:  Positive for malaise/fatigue. Negative for chills and fever.   Respiratory:  Negative for cough and shortness of breath.    Cardiovascular:  Negative for chest pain and leg swelling.   Gastrointestinal:  Positive for abdominal pain. Negative for nausea and vomiting.   Genitourinary:  Negative for dysuria, frequency and urgency.            No Known Allergies         Current Facility-Administered Medications:     0.45% NaCl with KCl 20 mEq infusion, , Intravenous, Continuous, Ciara Wisemanin, A.P.R.N., Last Rate: 100 mL/hr at 03/29/24 0946, New Bag at 03/29/24 0946    benazepril (Lotensin) tablet 40 mg, 40 mg, Oral, DAILY, Ciara Garcia, A.P.R.N., 40 mg at 03/29/24 0937    prochlorperazine (Compazine) injection 5 mg, 5 mg, Intravenous, Q6HRS PRN, Ciara Garcia, A.P.R.N., 5 mg at 03/28/24 0839    atenolol (Tenormin) tablet 25 mg, 25 mg, Oral, Q DAY, Alaina Virgen, A.P.R.N., 25 mg at 03/29/24 0422    amLODIPine (Norvasc) tablet 10 mg, 10 mg, Oral, DAILY, Alaina Virgen, A.P.R.N., 10 mg at 03/29/24 0432    lovastatin (Mevacor) tablet 40 mg, 40 mg, Oral, DAILY, Alaina Virgen, A.P.R.N., 40 mg at 03/29/24 0432    hydrALAZINE (Apresoline) injection 10 mg, 10 mg, Intravenous, Q4HRS PRN, Alaina Virgen, A.P.R.N., 10 mg at 03/28/24 1554    nystatin (Mycostatin) powder, , Topical, BID, Lorrie Harris M.D., 1 Application at 03/29/24 0423    enoxaparin (Lovenox) inj 40 mg, 40 mg, Subcutaneous, DAILY AT 1800, Ernst Holder M.D., 40 mg at 03/28/24 1822    senna-docusate (Pericolace Or Senokot S) 8.6-50 MG per tablet 2 Tablet, 2 Tablet, Oral, Q EVENING **AND** polyethylene  "glycol/lytes (Miralax) Packet 1 Packet, 1 Packet, Oral, QDAY PRN, Ernst Holder M.D.    Notify provider if pain remains uncontrolled, , , CONTINUOUS **AND** Use the Numeric Rating Scale (NRS), Fregoso-Baker Faces (WBF), or FLACC on regular floors and Critical-Care Pain Observation Tool (CPOT) on ICUs/Trauma to assess pain, , , CONTINUOUS **AND** Pulse Ox, , , CONTINUOUS **AND** Pharmacy Consult Request ...Pain Management Review 1 Each, 1 Each, Other, PHARMACY TO DOSE **AND** If patient difficult to arouse and/or has respiratory depression (respiratory rate of 10 or less), stop any opiates that are currently infusing and call a Rapid Response., , , CONTINUOUS, Ernst Holder M.D.    oxyCODONE immediate-release (Roxicodone) tablet 2.5 mg, 2.5 mg, Oral, Q3HRS PRN **OR** oxyCODONE immediate-release (Roxicodone) tablet 5 mg, 5 mg, Oral, Q3HRS PRN, 5 mg at 03/29/24 0943 **OR** [DISCONTINUED] morphine 4 MG/ML injection 2 mg, 2 mg, Intravenous, Q3HRS PRN, Ernst Holder M.D., 2 mg at 03/26/24 0858    ondansetron (Zofran) syringe/vial injection 4 mg, 4 mg, Intravenous, Q4HRS PRN, Ernst Holder M.D., 4 mg at 03/28/24 0642    ondansetron (Zofran ODT) dispertab 4 mg, 4 mg, Oral, Q4HRS PRN, Ernst Holder M.D., 4 mg at 03/27/24 0813    insulin regular (HumuLIN R,NovoLIN R) injection, 2-9 Units, Subcutaneous, Q6HRS, 3 Units at 03/28/24 1822 **AND** POC blood glucose manual result, , , Q6H **AND** NOTIFY MD and PharmD, , , Once **AND** Administer 20 grams of glucose (approximately 8 ounces of fruit juice) every 15 minutes PRN FSBG less than 70 mg/dL, , , PRN **AND** dextrose 10 % BOLUS 25 g, 25 g, Intravenous, Q15 MIN PRN, Ernst Holder M.D.       Objective:     BP (!) 167/70   Pulse 67   Temp 36.5 °C (97.7 °F) (Temporal)   Resp 17   Ht 1.651 m (5' 5\")   Wt 74.2 kg (163 lb 9.3 oz)   SpO2 95%     Physical Exam  Constitutional:       General: She is not in acute distress.  HENT:      Head: Normocephalic.      " Mouth/Throat:      Mouth: Mucous membranes are moist.   Cardiovascular:      Rate and Rhythm: Normal rate and regular rhythm.      Pulses: Normal pulses.   Pulmonary:      Effort: Pulmonary effort is normal. No respiratory distress.   Abdominal:      General: Abdomen is flat. There is no distension.      Palpations: Abdomen is soft.      Comments: Midline incision with dressing c/d/I, abdomen appropriately tender.    Musculoskeletal:         General: Normal range of motion.      Right lower leg: No edema.      Left lower leg: No edema.   Neurological:      Mental Status: She is alert and oriented to person, place, and time.              Recent Labs     03/27/24  0239 03/28/24  0202 03/29/24  0655   WBC 13.2* 13.8* 11.1*   RBC 3.80* 3.95* 3.80*   HEMOGLOBIN 11.6* 11.9* 11.4*   HEMATOCRIT 35.9* 36.3* 34.5*   MCV 94.5 91.9 90.8   MCH 30.5 30.1 30.0   MCHC 32.3 32.8 33.0   RDW 47.2 46.4 46.0   PLATELETCT 297 359 331   MPV 10.3 10.1 10.0     Recent Labs     03/27/24  0239 03/28/24  0202 03/29/24  0655   SODIUM 142 143 153*   POTASSIUM 4.3 4.0 3.1*   CHLORIDE 114* 114* 114*   CO2 16* 19* 27   GLUCOSE 144* 216* 155*   BUN 28* 30* 34*   CREATININE 1.11 0.89 0.89   CALCIUM 8.4* 8.3* 8.3*                     Assessment and Plan:  75 yo admitted for vaginal cuff dehiscence and small bowel evisceration, s/p exploratory laparotomy, celiotomy and vaginal cuff repair:     POD#4: now with post op ileus, with NG tube in place - post op care per Dr. Harris. Reviewed post op restriction including no lifting over 10 lbs, nothing per vagina, no bathing or swimming. She will need cuff check prior to release to normal activities, in 6 - 8 weeks as outpatient in our office, we will make this appointment for follow up.     We will sign off, if you have any questions please do not hesitate to call.       This case was discussed with Dr. Gera Austin, P.A.-C.  Gynecology Oncology  Center [facility]

## 2024-03-29 NOTE — CARE PLAN
The patient is Stable - Low risk of patient condition declining or worsening    Shift Goals  Clinical Goals: NGT Maintenance; Pain Control; BP Management  Patient Goals: Pain Control; Rest  Family Goals: none present at this time    Progress made toward(s) clinical / shift goals:  Patient medicated per MAR. Non-pharmacologic comfort measures implemented. Safety discussed. Education provided. Ambulation and repositioning encouraged.     Problem: Knowledge Deficit - Standard  Goal: Patient and family/care givers will demonstrate understanding of plan of care, disease process/condition, diagnostic tests and medications  3/28/2024 2249 by Yasmin Villa, R.N.  Outcome: Progressing  3/28/2024 2248 by Yasmin Villa, R.N.  Outcome: Progressing     Problem: Pain - Standard  Goal: Alleviation of pain or a reduction in pain to the patient’s comfort goal  3/28/2024 2249 by Yasmin Villa, R.N.  Outcome: Progressing  3/28/2024 2248 by Yasmin Villa, R.N.  Outcome: Progressing     Problem: Gastrointestinal Irritability  Goal: Nausea and vomiting will be absent or improve  Outcome: Progressing

## 2024-03-29 NOTE — PROGRESS NOTES
Orders for NG tube placement.   Difficult NG placement due to resistance and tube curling into patient's mouth. Multiple Rns and Charge RN assisting with placement. APRN notified of difficult placement.   Rapid RN to bedside to assist with placement. Gastric aspirate present,1500 mL out, green watery fluid upon placement.  Awaiting 2nd xray interpretation. Update sent to APRN

## 2024-03-29 NOTE — PROGRESS NOTES
"Received report from previous shift RN at 1900.  Assessment complete.  A&O x 4. Patient calls appropriately.  Patient ambulates with x1 hand-held assist.   Patient has 8/10 pain. Pain managed with prescribed medications per MAR.  Denies N&V. Pt NPO, tolerating sips with meds. R NGT in place to LCS.  Skin per flowsheets.  + void, - flatus, - BM.  Patient denies SOB on RA.  BP (!) 165/66   Pulse 63   Temp 36.8 °C (98.2 °F) (Temporal)   Resp 18   Ht 1.651 m (5' 5\")   Wt 74.2 kg (163 lb 9.3 oz)   SpO2 (!) 62%   BMI 27.22 kg/m²     Patient pleasant and cooperative throughout assessment.  Reviewed plan of care with patient, pt verbalizes understanding. Call light and personal belongings with in reach. Hourly rounding in place. All needs met at this time.    "

## 2024-03-30 ENCOUNTER — APPOINTMENT (OUTPATIENT)
Dept: RADIOLOGY | Facility: MEDICAL CENTER | Age: 74
DRG: 746 | End: 2024-03-30
Attending: SURGERY
Payer: MEDICARE

## 2024-03-30 LAB
ANION GAP SERPL CALC-SCNC: 13 MMOL/L (ref 7–16)
ANION GAP SERPL CALC-SCNC: 14 MMOL/L (ref 7–16)
ANION GAP SERPL CALC-SCNC: 15 MMOL/L (ref 7–16)
BUN SERPL-MCNC: 18 MG/DL (ref 8–22)
BUN SERPL-MCNC: 24 MG/DL (ref 8–22)
BUN SERPL-MCNC: 24 MG/DL (ref 8–22)
CALCIUM SERPL-MCNC: 8.1 MG/DL (ref 8.5–10.5)
CALCIUM SERPL-MCNC: 8.3 MG/DL (ref 8.5–10.5)
CALCIUM SERPL-MCNC: 8.3 MG/DL (ref 8.5–10.5)
CHLORIDE SERPL-SCNC: 104 MMOL/L (ref 96–112)
CHLORIDE SERPL-SCNC: 108 MMOL/L (ref 96–112)
CHLORIDE SERPL-SCNC: 110 MMOL/L (ref 96–112)
CO2 SERPL-SCNC: 33 MMOL/L (ref 20–33)
CO2 SERPL-SCNC: 33 MMOL/L (ref 20–33)
CO2 SERPL-SCNC: 35 MMOL/L (ref 20–33)
CREAT SERPL-MCNC: 0.82 MG/DL (ref 0.5–1.4)
CREAT SERPL-MCNC: 0.86 MG/DL (ref 0.5–1.4)
CREAT SERPL-MCNC: 0.9 MG/DL (ref 0.5–1.4)
ERYTHROCYTE [DISTWIDTH] IN BLOOD BY AUTOMATED COUNT: 44.8 FL (ref 35.9–50)
GFR SERPLBLD CREATININE-BSD FMLA CKD-EPI: 67 ML/MIN/1.73 M 2
GFR SERPLBLD CREATININE-BSD FMLA CKD-EPI: 70 ML/MIN/1.73 M 2
GFR SERPLBLD CREATININE-BSD FMLA CKD-EPI: 75 ML/MIN/1.73 M 2
GLUCOSE BLD STRIP.AUTO-MCNC: 147 MG/DL (ref 65–99)
GLUCOSE BLD STRIP.AUTO-MCNC: 155 MG/DL (ref 65–99)
GLUCOSE BLD STRIP.AUTO-MCNC: 174 MG/DL (ref 65–99)
GLUCOSE SERPL-MCNC: 161 MG/DL (ref 65–99)
GLUCOSE SERPL-MCNC: 170 MG/DL (ref 65–99)
GLUCOSE SERPL-MCNC: 194 MG/DL (ref 65–99)
HCT VFR BLD AUTO: 34.7 % (ref 37–47)
HGB BLD-MCNC: 11.4 G/DL (ref 12–16)
MAGNESIUM SERPL-MCNC: 1.8 MG/DL (ref 1.5–2.5)
MCH RBC QN AUTO: 30.2 PG (ref 27–33)
MCHC RBC AUTO-ENTMCNC: 32.9 G/DL (ref 32.2–35.5)
MCV RBC AUTO: 92 FL (ref 81.4–97.8)
PHOSPHATE SERPL-MCNC: 2.5 MG/DL (ref 2.5–4.5)
PLATELET # BLD AUTO: 305 K/UL (ref 164–446)
PMV BLD AUTO: 10 FL (ref 9–12.9)
POTASSIUM SERPL-SCNC: 2.9 MMOL/L (ref 3.6–5.5)
POTASSIUM SERPL-SCNC: 2.9 MMOL/L (ref 3.6–5.5)
POTASSIUM SERPL-SCNC: 3 MMOL/L (ref 3.6–5.5)
RBC # BLD AUTO: 3.77 M/UL (ref 4.2–5.4)
SODIUM SERPL-SCNC: 150 MMOL/L (ref 135–145)
SODIUM SERPL-SCNC: 156 MMOL/L (ref 135–145)
SODIUM SERPL-SCNC: 159 MMOL/L (ref 135–145)
WBC # BLD AUTO: 8.4 K/UL (ref 4.8–10.8)

## 2024-03-30 PROCEDURE — 36415 COLL VENOUS BLD VENIPUNCTURE: CPT

## 2024-03-30 PROCEDURE — 700111 HCHG RX REV CODE 636 W/ 250 OVERRIDE (IP): Mod: JZ

## 2024-03-30 PROCEDURE — A9270 NON-COVERED ITEM OR SERVICE: HCPCS | Performed by: NURSE PRACTITIONER

## 2024-03-30 PROCEDURE — A9270 NON-COVERED ITEM OR SERVICE: HCPCS

## 2024-03-30 PROCEDURE — 85027 COMPLETE CBC AUTOMATED: CPT

## 2024-03-30 PROCEDURE — 700101 HCHG RX REV CODE 250

## 2024-03-30 PROCEDURE — 700111 HCHG RX REV CODE 636 W/ 250 OVERRIDE (IP)

## 2024-03-30 PROCEDURE — 700102 HCHG RX REV CODE 250 W/ 637 OVERRIDE(OP): Performed by: INTERNAL MEDICINE

## 2024-03-30 PROCEDURE — 700102 HCHG RX REV CODE 250 W/ 637 OVERRIDE(OP): Performed by: NURSE PRACTITIONER

## 2024-03-30 PROCEDURE — 700105 HCHG RX REV CODE 258

## 2024-03-30 PROCEDURE — 770001 HCHG ROOM/CARE - MED/SURG/GYN PRIV*

## 2024-03-30 PROCEDURE — 74018 RADEX ABDOMEN 1 VIEW: CPT

## 2024-03-30 PROCEDURE — 700105 HCHG RX REV CODE 258: Performed by: SURGERY

## 2024-03-30 PROCEDURE — 99024 POSTOP FOLLOW-UP VISIT: CPT | Performed by: NURSE PRACTITIONER

## 2024-03-30 PROCEDURE — 700111 HCHG RX REV CODE 636 W/ 250 OVERRIDE (IP): Mod: JZ | Performed by: INTERNAL MEDICINE

## 2024-03-30 PROCEDURE — 700111 HCHG RX REV CODE 636 W/ 250 OVERRIDE (IP): Mod: JZ | Performed by: NURSE PRACTITIONER

## 2024-03-30 PROCEDURE — 700102 HCHG RX REV CODE 250 W/ 637 OVERRIDE(OP)

## 2024-03-30 PROCEDURE — A9270 NON-COVERED ITEM OR SERVICE: HCPCS | Performed by: INTERNAL MEDICINE

## 2024-03-30 PROCEDURE — 80048 BASIC METABOLIC PNL TOTAL CA: CPT

## 2024-03-30 PROCEDURE — 83735 ASSAY OF MAGNESIUM: CPT

## 2024-03-30 PROCEDURE — 82962 GLUCOSE BLOOD TEST: CPT | Mod: 91

## 2024-03-30 PROCEDURE — 84100 ASSAY OF PHOSPHORUS: CPT

## 2024-03-30 RX ORDER — SODIUM CHLORIDE, SODIUM LACTATE, POTASSIUM CHLORIDE, AND CALCIUM CHLORIDE .6; .31; .03; .02 G/100ML; G/100ML; G/100ML; G/100ML
2000 INJECTION, SOLUTION INTRAVENOUS ONCE
Status: COMPLETED | OUTPATIENT
Start: 2024-03-30 | End: 2024-03-30

## 2024-03-30 RX ORDER — MAGNESIUM SULFATE HEPTAHYDRATE 40 MG/ML
2 INJECTION, SOLUTION INTRAVENOUS ONCE
Status: COMPLETED | OUTPATIENT
Start: 2024-03-30 | End: 2024-03-30

## 2024-03-30 RX ORDER — POTASSIUM CHLORIDE 7.45 MG/ML
10 INJECTION INTRAVENOUS
Status: DISCONTINUED | OUTPATIENT
Start: 2024-03-30 | End: 2024-03-30

## 2024-03-30 RX ORDER — METOCLOPRAMIDE HYDROCHLORIDE 5 MG/ML
5 INJECTION INTRAMUSCULAR; INTRAVENOUS EVERY 6 HOURS
Status: DISCONTINUED | OUTPATIENT
Start: 2024-03-30 | End: 2024-04-02 | Stop reason: HOSPADM

## 2024-03-30 RX ADMIN — SODIUM CHLORIDE, POTASSIUM CHLORIDE, SODIUM LACTATE AND CALCIUM CHLORIDE 2000 ML: 600; 310; 30; 20 INJECTION, SOLUTION INTRAVENOUS at 08:11

## 2024-03-30 RX ADMIN — SENNOSIDES AND DOCUSATE SODIUM 2 TABLET: 50; 8.6 TABLET ORAL at 17:54

## 2024-03-30 RX ADMIN — LOVASTATIN 40 MG: 20 TABLET ORAL at 04:13

## 2024-03-30 RX ADMIN — METOCLOPRAMIDE 5 MG: 5 INJECTION, SOLUTION INTRAMUSCULAR; INTRAVENOUS at 17:54

## 2024-03-30 RX ADMIN — ATENOLOL 25 MG: 25 TABLET ORAL at 04:12

## 2024-03-30 RX ADMIN — POTASSIUM PHOSPHATE, MONOBASIC AND POTASSIUM PHOSPHATE, DIBASIC 30 MMOL: 224; 236 INJECTION, SOLUTION, CONCENTRATE INTRAVENOUS at 12:14

## 2024-03-30 RX ADMIN — AMLODIPINE BESYLATE 10 MG: 10 TABLET ORAL at 04:12

## 2024-03-30 RX ADMIN — ENOXAPARIN SODIUM 40 MG: 100 INJECTION SUBCUTANEOUS at 17:54

## 2024-03-30 RX ADMIN — INSULIN HUMAN 2 UNITS: 100 INJECTION, SOLUTION PARENTERAL at 17:47

## 2024-03-30 RX ADMIN — HYDRALAZINE HYDROCHLORIDE 10 MG: 20 INJECTION INTRAMUSCULAR; INTRAVENOUS at 15:39

## 2024-03-30 RX ADMIN — OXYCODONE 5 MG: 5 TABLET ORAL at 04:12

## 2024-03-30 RX ADMIN — OXYCODONE 5 MG: 5 TABLET ORAL at 12:47

## 2024-03-30 RX ADMIN — NYSTATIN: 100000 POWDER TOPICAL at 17:54

## 2024-03-30 RX ADMIN — OXYCODONE 5 MG: 5 TABLET ORAL at 08:27

## 2024-03-30 RX ADMIN — OXYCODONE 5 MG: 5 TABLET ORAL at 21:13

## 2024-03-30 RX ADMIN — INSULIN HUMAN 2 UNITS: 100 INJECTION, SOLUTION PARENTERAL at 12:45

## 2024-03-30 RX ADMIN — OXYCODONE 5 MG: 5 TABLET ORAL at 17:54

## 2024-03-30 RX ADMIN — METOCLOPRAMIDE 5 MG: 5 INJECTION, SOLUTION INTRAMUSCULAR; INTRAVENOUS at 12:14

## 2024-03-30 RX ADMIN — MAGNESIUM SULFATE HEPTAHYDRATE 2 G: 2 INJECTION, SOLUTION INTRAVENOUS at 09:37

## 2024-03-30 RX ADMIN — BENAZEPRIL HYDROCHLORIDE 40 MG: 20 TABLET ORAL at 04:13

## 2024-03-30 RX ADMIN — NYSTATIN: 100000 POWDER TOPICAL at 04:14

## 2024-03-30 ASSESSMENT — PAIN DESCRIPTION - PAIN TYPE
TYPE: ACUTE PAIN

## 2024-03-30 NOTE — PROGRESS NOTES
Bedside report received.  Assessment complete.  A&O x 4. Patient calls appropriately.  Patient ambulates with SB assist.    Patient has 8/10 pain. Pain managed with prescribed medications.  Denies N&V.  NPO with sips, right nare NGT to LCS in place.  MLI with staples, approximated, CORINNA  + void, + flatus, - BM.  Patient denies SOB.  SCD's refused.    Review plan with of care with patient. Call light and personal belongings within reach. Hourly rounding in place. All needs met at this time.

## 2024-03-30 NOTE — CARE PLAN
The patient is Stable - Low risk of patient condition declining or worsening    Shift Goals  Clinical Goals: NGT Maintenance; Bowel Function; Pain Control  Patient Goals: Pain Control; Ambulate  Family Goals: POC updates    Progress made toward(s) clinical / shift goals:  Patient medicated per MAR. Non-pharmacologic comfort measures implemented. Safety discussed. Education provided. Ambulation and repositioning encouraged.     Problem: Knowledge Deficit - Standard  Goal: Patient and family/care givers will demonstrate understanding of plan of care, disease process/condition, diagnostic tests and medications  Outcome: Progressing     Problem: Pain - Standard  Goal: Alleviation of pain or a reduction in pain to the patient’s comfort goal  Outcome: Progressing     Problem: Gastrointestinal Irritability  Goal: Nausea and vomiting will be absent or improve  Outcome: Progressing

## 2024-03-30 NOTE — PROGRESS NOTES
"Received report from previous shift RN at 1900.  Assessment complete.  A&O x 4. Patient calls appropriately.  Patient ambulates with standby assist and FWW.  Patient has 8/10 pain. Pain managed with prescribed medications per MAR.  Denies N&V. Pt NPO, tolerating sips with meds. R NGT in place to LCS.  Skin per flowsheets.  + void, + flatus, - BM.  Patient denies SOB on RA.  BP (!) 169/73   Pulse 69   Temp 36.7 °C (98.1 °F) (Temporal)   Resp 17   Ht 1.651 m (5' 5\")   Wt 74.2 kg (163 lb 9.3 oz)   SpO2 91%   BMI 27.22 kg/m²     Patient pleasant and cooperative throughout assessment.  Reviewed plan of care with patient, pt verbalizes understanding. Call light and personal belongings with in reach. Hourly rounding in place. All needs met at this time.    "

## 2024-03-30 NOTE — CARE PLAN
The patient is Stable - Low risk of patient condition declining or worsening    Shift Goals  Clinical Goals: Remain free of nausea and vomiting  Patient Goals: shower  Family Goals: POC updates    Progress made toward(s) clinical / shift goals:  Pt reports that although nausea is still there, it is much less than previous days, and is tolerable    Patient is not progressing towards the following goals:

## 2024-03-30 NOTE — PROGRESS NOTES
"  DATE: 3/30/2024    Post operative day 5 exploratory laparotomy, reduction of herniated bowel, closure of vaginal cuff (Dr. Boss), bowel resection and anastomosis.     INTERVAL EVENTS:  Up to chair, feels good, adequate pain control, no N/V, passing flatus.  Large volume NGT output, pt is taking in quite a bit of ice chips for dry mouth. XR reviewed and appears NGT is in duodenum.  Fluid losses and electrolytes noted and repleted.    PHYSICAL EXAMINATION:  Vital Signs: BP (!) 159/79   Pulse 67   Temp 36.7 °C (98.1 °F) (Temporal)   Resp 18   Ht 1.651 m (5' 5\")   Wt 74.2 kg (163 lb 9.3 oz)   SpO2 94%     Large bilious, clear output from NGT. Abdomen soft, non tender. Midline incision approximated with staples.     LABORATORY VALUES:  Recent Labs     03/28/24  0202 03/29/24  0655 03/30/24  0334   WBC 13.8* 11.1* 8.4   RBC 3.95* 3.80* 3.77*   HEMOGLOBIN 11.9* 11.4* 11.4*   HEMATOCRIT 36.3* 34.5* 34.7*   MCV 91.9 90.8 92.0   MCH 30.1 30.0 30.2   MCHC 32.8 33.0 32.9   RDW 46.4 46.0 44.8   PLATELETCT 359 331 305   MPV 10.1 10.0 10.0     Recent Labs     03/28/24  0202 03/29/24  0655 03/30/24  0334   SODIUM 143 153* 159*   POTASSIUM 4.0 3.1* 2.9*   CHLORIDE 114* 114* 110   CO2 19* 27 35*   GLUCOSE 216* 155* 161*   BUN 30* 34* 24*   CREATININE 0.89 0.89 0.86   CALCIUM 8.3* 8.3* 8.3*                IMAGING:  DX-ABDOMEN FOR TUBE PLACEMENT   Final Result      NG tube tip overlies the gastric antrum.      DX-CHEST-LIMITED (1 VIEW)   Final Result      1.  NG tube tip located just above the gastroesophageal junction. Advancement into the stomach is recommended.      2.  The patient's nurse was contacted regarding the tube position at 5:02 PM on 3/28/2024.      [id number] VIEW   Final Result      1. Small bowel ileus versus distal mechanical small bowel obstruction. Consider small bowel series for further evaluation.   2. Other chronic degenerative and post surgical changes.      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  Had BM and felt bowel come out 3/24 AM.  Seen in [place] and transferred to [place].  Large volume evisceration of small bowel coming through perineum upon arrival.  3/25 Exploratory celiotomy & repair of vaginal cuff dehiscence.  Dr. Boss, gynecology oncology.  [facility].    Ileus following gastrointestinal surgery (HCC)- (present on admission)  Assessment & Plan  3/28 NPO/NGT to LCWS.  Awaiting return of bowel function.    Hypertension- (present on admission)  Assessment & Plan  Chronic condition treated with atenolol, amlodipine & benazepril.  3/26 Resumed atenolol & amlodipine.  3/29 Resumed benazepril.    Type 2 diabetes mellitus (HCC)- (present on admission)  Assessment & Plan  Chronic condition treated with metformin.  Holding maintenance metformin.  Insulin sliding scale coverage.  Plan to resumed upon discharge.    Hyperkalemia- (present on admission)  Assessment & Plan  ACE inhibitor held upon admission.  3/25 Resolved.  3/29 Resumed ACE. Monitor.    No contraindication to deep vein thrombosis (DVT) prophylaxis- (present on admission)  Assessment & Plan  3/26 Prophylactic dose enoxaparin 40 mg QD initiated.     Hyperlipidemia- (present on admission)  Assessment & Plan  Chronic condition treated with lovastatin.  3/26 Resumed maintenance medication.    - Repeat BMP pending.  - Pull NGT back about 6 inches.  - Add Reglan.  - Continue NGT to suction for today.       ____________________________________     CONSTANTINO Whipple.    DD: 3/30/2024  7:46 AM

## 2024-03-31 VITALS
HEART RATE: 62 BPM | WEIGHT: 163.58 LBS | SYSTOLIC BLOOD PRESSURE: 141 MMHG | OXYGEN SATURATION: 91 % | TEMPERATURE: 98.6 F | HEIGHT: 65 IN | DIASTOLIC BLOOD PRESSURE: 65 MMHG | BODY MASS INDEX: 27.25 KG/M2 | RESPIRATION RATE: 18 BRPM

## 2024-03-31 LAB
ANION GAP SERPL CALC-SCNC: 11 MMOL/L (ref 7–16)
ANION GAP SERPL CALC-SCNC: 11 MMOL/L (ref 7–16)
BUN SERPL-MCNC: 17 MG/DL (ref 8–22)
BUN SERPL-MCNC: 18 MG/DL (ref 8–22)
CALCIUM SERPL-MCNC: 7.8 MG/DL (ref 8.5–10.5)
CALCIUM SERPL-MCNC: 7.9 MG/DL (ref 8.5–10.5)
CHLORIDE SERPL-SCNC: 104 MMOL/L (ref 96–112)
CHLORIDE SERPL-SCNC: 106 MMOL/L (ref 96–112)
CO2 SERPL-SCNC: 33 MMOL/L (ref 20–33)
CO2 SERPL-SCNC: 34 MMOL/L (ref 20–33)
CREAT SERPL-MCNC: 0.81 MG/DL (ref 0.5–1.4)
CREAT SERPL-MCNC: 0.83 MG/DL (ref 0.5–1.4)
ERYTHROCYTE [DISTWIDTH] IN BLOOD BY AUTOMATED COUNT: 44.8 FL (ref 35.9–50)
GFR SERPLBLD CREATININE-BSD FMLA CKD-EPI: 73 ML/MIN/1.73 M 2
GFR SERPLBLD CREATININE-BSD FMLA CKD-EPI: 76 ML/MIN/1.73 M 2
GLUCOSE BLD STRIP.AUTO-MCNC: 140 MG/DL (ref 65–99)
GLUCOSE BLD STRIP.AUTO-MCNC: 145 MG/DL (ref 65–99)
GLUCOSE BLD STRIP.AUTO-MCNC: 148 MG/DL (ref 65–99)
GLUCOSE BLD STRIP.AUTO-MCNC: 195 MG/DL (ref 65–99)
GLUCOSE SERPL-MCNC: 158 MG/DL (ref 65–99)
GLUCOSE SERPL-MCNC: 159 MG/DL (ref 65–99)
HCT VFR BLD AUTO: 33.7 % (ref 37–47)
HGB BLD-MCNC: 11.2 G/DL (ref 12–16)
MAGNESIUM SERPL-MCNC: 2 MG/DL (ref 1.5–2.5)
MCH RBC QN AUTO: 30.4 PG (ref 27–33)
MCHC RBC AUTO-ENTMCNC: 33.2 G/DL (ref 32.2–35.5)
MCV RBC AUTO: 91.6 FL (ref 81.4–97.8)
PHOSPHATE SERPL-MCNC: 3.5 MG/DL (ref 2.5–4.5)
PLATELET # BLD AUTO: 278 K/UL (ref 164–446)
PMV BLD AUTO: 10.2 FL (ref 9–12.9)
POTASSIUM SERPL-SCNC: 3 MMOL/L (ref 3.6–5.5)
POTASSIUM SERPL-SCNC: 3.5 MMOL/L (ref 3.6–5.5)
RBC # BLD AUTO: 3.68 M/UL (ref 4.2–5.4)
SODIUM SERPL-SCNC: 148 MMOL/L (ref 135–145)
SODIUM SERPL-SCNC: 151 MMOL/L (ref 135–145)
WBC # BLD AUTO: 9.1 K/UL (ref 4.8–10.8)

## 2024-03-31 PROCEDURE — 700102 HCHG RX REV CODE 250 W/ 637 OVERRIDE(OP)

## 2024-03-31 PROCEDURE — 700111 HCHG RX REV CODE 636 W/ 250 OVERRIDE (IP)

## 2024-03-31 PROCEDURE — 85027 COMPLETE CBC AUTOMATED: CPT

## 2024-03-31 PROCEDURE — 99024 POSTOP FOLLOW-UP VISIT: CPT | Performed by: NURSE PRACTITIONER

## 2024-03-31 PROCEDURE — 770001 HCHG ROOM/CARE - MED/SURG/GYN PRIV*

## 2024-03-31 PROCEDURE — 700102 HCHG RX REV CODE 250 W/ 637 OVERRIDE(OP): Performed by: INTERNAL MEDICINE

## 2024-03-31 PROCEDURE — A9270 NON-COVERED ITEM OR SERVICE: HCPCS | Performed by: NURSE PRACTITIONER

## 2024-03-31 PROCEDURE — 700111 HCHG RX REV CODE 636 W/ 250 OVERRIDE (IP): Mod: JZ | Performed by: NURSE PRACTITIONER

## 2024-03-31 PROCEDURE — 700102 HCHG RX REV CODE 250 W/ 637 OVERRIDE(OP): Performed by: NURSE PRACTITIONER

## 2024-03-31 PROCEDURE — 82962 GLUCOSE BLOOD TEST: CPT | Mod: 91

## 2024-03-31 PROCEDURE — 36415 COLL VENOUS BLD VENIPUNCTURE: CPT

## 2024-03-31 PROCEDURE — 83735 ASSAY OF MAGNESIUM: CPT

## 2024-03-31 PROCEDURE — A9270 NON-COVERED ITEM OR SERVICE: HCPCS

## 2024-03-31 PROCEDURE — 700111 HCHG RX REV CODE 636 W/ 250 OVERRIDE (IP): Mod: JZ | Performed by: INTERNAL MEDICINE

## 2024-03-31 PROCEDURE — A9270 NON-COVERED ITEM OR SERVICE: HCPCS | Performed by: INTERNAL MEDICINE

## 2024-03-31 PROCEDURE — 84100 ASSAY OF PHOSPHORUS: CPT

## 2024-03-31 PROCEDURE — 80048 BASIC METABOLIC PNL TOTAL CA: CPT

## 2024-03-31 PROCEDURE — 700101 HCHG RX REV CODE 250: Performed by: SURGERY

## 2024-03-31 RX ORDER — POTASSIUM CHLORIDE 7.45 MG/ML
10 INJECTION INTRAVENOUS
Status: COMPLETED | OUTPATIENT
Start: 2024-03-31 | End: 2024-03-31

## 2024-03-31 RX ADMIN — POTASSIUM CHLORIDE 10 MEQ: 7.46 INJECTION, SOLUTION INTRAVENOUS at 03:11

## 2024-03-31 RX ADMIN — ATENOLOL 25 MG: 25 TABLET ORAL at 04:49

## 2024-03-31 RX ADMIN — POTASSIUM CHLORIDE AND SODIUM CHLORIDE: 450; 150 INJECTION, SOLUTION INTRAVENOUS at 01:21

## 2024-03-31 RX ADMIN — METOCLOPRAMIDE 5 MG: 5 INJECTION, SOLUTION INTRAMUSCULAR; INTRAVENOUS at 00:55

## 2024-03-31 RX ADMIN — BENAZEPRIL HYDROCHLORIDE 40 MG: 20 TABLET ORAL at 04:49

## 2024-03-31 RX ADMIN — HYDRALAZINE HYDROCHLORIDE 10 MG: 20 INJECTION INTRAMUSCULAR; INTRAVENOUS at 20:36

## 2024-03-31 RX ADMIN — SENNOSIDES AND DOCUSATE SODIUM 2 TABLET: 50; 8.6 TABLET ORAL at 17:31

## 2024-03-31 RX ADMIN — METOCLOPRAMIDE 5 MG: 5 INJECTION, SOLUTION INTRAMUSCULAR; INTRAVENOUS at 04:49

## 2024-03-31 RX ADMIN — INSULIN HUMAN 2 UNITS: 100 INJECTION, SOLUTION PARENTERAL at 13:36

## 2024-03-31 RX ADMIN — LOVASTATIN 40 MG: 20 TABLET ORAL at 04:50

## 2024-03-31 RX ADMIN — METOCLOPRAMIDE 5 MG: 5 INJECTION, SOLUTION INTRAMUSCULAR; INTRAVENOUS at 23:47

## 2024-03-31 RX ADMIN — OXYCODONE 5 MG: 5 TABLET ORAL at 04:49

## 2024-03-31 RX ADMIN — AMLODIPINE BESYLATE 10 MG: 10 TABLET ORAL at 04:49

## 2024-03-31 RX ADMIN — POTASSIUM CHLORIDE AND SODIUM CHLORIDE: 450; 150 INJECTION, SOLUTION INTRAVENOUS at 13:58

## 2024-03-31 RX ADMIN — HYDRALAZINE HYDROCHLORIDE 10 MG: 20 INJECTION INTRAMUSCULAR; INTRAVENOUS at 06:06

## 2024-03-31 RX ADMIN — ENOXAPARIN SODIUM 40 MG: 100 INJECTION SUBCUTANEOUS at 17:32

## 2024-03-31 RX ADMIN — METOCLOPRAMIDE 5 MG: 5 INJECTION, SOLUTION INTRAMUSCULAR; INTRAVENOUS at 13:35

## 2024-03-31 RX ADMIN — POTASSIUM CHLORIDE 10 MEQ: 7.46 INJECTION, SOLUTION INTRAVENOUS at 04:32

## 2024-03-31 RX ADMIN — NYSTATIN: 100000 POWDER TOPICAL at 04:49

## 2024-03-31 RX ADMIN — OXYCODONE 5 MG: 5 TABLET ORAL at 00:55

## 2024-03-31 RX ADMIN — METOCLOPRAMIDE 5 MG: 5 INJECTION, SOLUTION INTRAMUSCULAR; INTRAVENOUS at 18:42

## 2024-03-31 RX ADMIN — POTASSIUM CHLORIDE 10 MEQ: 7.46 INJECTION, SOLUTION INTRAVENOUS at 02:15

## 2024-03-31 RX ADMIN — POTASSIUM CHLORIDE 10 MEQ: 7.46 INJECTION, SOLUTION INTRAVENOUS at 01:25

## 2024-03-31 RX ADMIN — NYSTATIN: 100000 POWDER TOPICAL at 17:35

## 2024-03-31 RX ADMIN — POTASSIUM CHLORIDE AND SODIUM CHLORIDE: 450; 150 INJECTION, SOLUTION INTRAVENOUS at 22:02

## 2024-03-31 ASSESSMENT — PAIN DESCRIPTION - PAIN TYPE
TYPE: ACUTE PAIN

## 2024-03-31 NOTE — PROGRESS NOTES
"Received report from previous shift RN at 1900.  Assessment complete.  A&O x 4. Patient calls appropriately.  Patient ambulates with standby assist and FWW.   Patient has 7/10 pain. Pain managed with prescribed medications per MAR.  Denies N&V. Pt NPO, tolerating sips with meds. R NGT in place to LCS.  Skin per flowsheets.  + void, + flatus, - BM.  Patient denies SOB on RA.  BP (!) 158/73   Pulse 63   Temp 36.5 °C (97.7 °F) (Temporal)   Resp 16   Ht 1.651 m (5' 5\")   Wt 74.2 kg (163 lb 9.3 oz)   SpO2 90%   BMI 27.22 kg/m²     Patient pleasant and cooperative throughout assessment.  Reviewed plan of care with patient, pt verbalizes understanding. Call light and personal belongings with in reach. Hourly rounding in place. All needs met at this time.    "

## 2024-03-31 NOTE — PROGRESS NOTES
Patient with no increased nausea or abdominal pain after clear liquid lunch. NGT removed per order.

## 2024-03-31 NOTE — PROGRESS NOTES
"  DATE: 3/31/2024    Post operative day 6 Exploratory laparotomy, reduction of herniated bowel, closure of vaginal cuff (Dr. Boss), bowel resection and anastomosis.     INTERVAL EVENTS:  Hungry, denies N/V, passing flatus.  NGT output decreased after pulling back from the duodenum.    PHYSICAL EXAMINATION:  Vital Signs: BP (!) 152/67   Pulse 64   Temp 36.4 °C (97.5 °F) (Temporal)   Resp 18   Ht 1.651 m (5' 5\")   Wt 74.2 kg (163 lb 9.3 oz)   SpO2 90%     Large bilious output from NGT. Abdomen soft, non tender. Midline incision approximated with staples.    LABORATORY VALUES:  Recent Labs     03/29/24  0655 03/30/24  0334 03/31/24  0008   WBC 11.1* 8.4 9.1   RBC 3.80* 3.77* 3.68*   HEMOGLOBIN 11.4* 11.4* 11.2*   HEMATOCRIT 34.5* 34.7* 33.7*   MCV 90.8 92.0 91.6   MCH 30.0 30.2 30.4   MCHC 33.0 32.9 33.2   RDW 46.0 44.8 44.8   PLATELETCT 331 305 278   MPV 10.0 10.0 10.2     Recent Labs     03/30/24  1755 03/31/24  0008 03/31/24  0544   SODIUM 150* 151* 148*   POTASSIUM 2.9* 3.0* 3.5*   CHLORIDE 104 106 104   CO2 33 34* 33   GLUCOSE 194* 158* 159*   BUN 18 18 17   CREATININE 0.82 0.81 0.83   CALCIUM 8.1* 7.9* 7.8*         ASSESSMENT AND PLAN:  * Prolapse of small intestine- (present on admission)  Assessment & Plan  Hx of hysterectomy 40 years ago.  Had BM and felt bowel come out 3/24 AM.  Seen in [place] and transferred to [place].  Large volume evisceration of small bowel coming through perineum upon arrival.  3/25 Exploratory celiotomy & repair of vaginal cuff dehiscence.  Dr. Boss, gynecology oncology.  [facility].    Ileus following gastrointestinal surgery (HCC)- (present on admission)  Assessment & Plan  3/28 NPO/NGT to LCWS.  3/31 Trial NGT clamping and sips of clears.    Hypertension- (present on admission)  Assessment & Plan  Chronic condition treated with atenolol, amlodipine & benazepril.  3/26 Resumed atenolol & amlodipine.  3/29 Resumed benazepril.    Type 2 diabetes mellitus (HCC)- (present " on admission)  Assessment & Plan  Chronic condition treated with metformin.  Holding maintenance metformin.  Insulin sliding scale coverage.  Plan to resumed upon discharge.    Hyperkalemia- (present on admission)  Assessment & Plan  ACE inhibitor held upon admission.  3/25 Resolved.  3/29 Resumed ACE. Monitor.    No contraindication to deep vein thrombosis (DVT) prophylaxis- (present on admission)  Assessment & Plan  3/26 Prophylactic dose enoxaparin 40 mg QD initiated.     Hyperlipidemia- (present on admission)  Assessment & Plan  Chronic condition treated with lovastatin.  3/26 Resumed maintenance medication.           ____________________________________     QUIN Whipple    DD: 3/31/2024  7:37 AM

## 2024-03-31 NOTE — CARE PLAN
The patient is Stable - Low risk of patient condition declining or worsening    Shift Goals  Clinical Goals: Pain Control; NGT Maintenance; Monitor/Balance Elctrolytes  Patient Goals: Pain Control; Self Care  Family Goals: POC updates    Progress made toward(s) clinical / shift goals:  Patient medicated per MAR. Non-pharmacologic comfort measures implemented. Safety discussed. Education provided. Ambulation and repositioning encouraged.     Problem: Knowledge Deficit - Standard  Goal: Patient and family/care givers will demonstrate understanding of plan of care, disease process/condition, diagnostic tests and medications  Outcome: Progressing     Problem: Pain - Standard  Goal: Alleviation of pain or a reduction in pain to the patient’s comfort goal  Outcome: Progressing     Problem: Fall Risk  Goal: Patient will remain free from falls  Outcome: Progressing

## 2024-04-01 LAB
ANION GAP SERPL CALC-SCNC: 12 MMOL/L (ref 7–16)
BUN SERPL-MCNC: 16 MG/DL (ref 8–22)
CALCIUM SERPL-MCNC: 7.7 MG/DL (ref 8.5–10.5)
CHLORIDE SERPL-SCNC: 104 MMOL/L (ref 96–112)
CO2 SERPL-SCNC: 26 MMOL/L (ref 20–33)
CREAT SERPL-MCNC: 0.81 MG/DL (ref 0.5–1.4)
ERYTHROCYTE [DISTWIDTH] IN BLOOD BY AUTOMATED COUNT: 45.3 FL (ref 35.9–50)
GFR SERPLBLD CREATININE-BSD FMLA CKD-EPI: 76 ML/MIN/1.73 M 2
GLUCOSE BLD STRIP.AUTO-MCNC: 140 MG/DL (ref 65–99)
GLUCOSE BLD STRIP.AUTO-MCNC: 147 MG/DL (ref 65–99)
GLUCOSE BLD STRIP.AUTO-MCNC: 184 MG/DL (ref 65–99)
GLUCOSE BLD STRIP.AUTO-MCNC: 200 MG/DL (ref 65–99)
GLUCOSE SERPL-MCNC: 150 MG/DL (ref 65–99)
HCT VFR BLD AUTO: 34.3 % (ref 37–47)
HGB BLD-MCNC: 11 G/DL (ref 12–16)
MCH RBC QN AUTO: 29.6 PG (ref 27–33)
MCHC RBC AUTO-ENTMCNC: 32.1 G/DL (ref 32.2–35.5)
MCV RBC AUTO: 92.2 FL (ref 81.4–97.8)
PLATELET # BLD AUTO: 262 K/UL (ref 164–446)
PMV BLD AUTO: 10.6 FL (ref 9–12.9)
POTASSIUM SERPL-SCNC: 3.7 MMOL/L (ref 3.6–5.5)
RBC # BLD AUTO: 3.72 M/UL (ref 4.2–5.4)
SODIUM SERPL-SCNC: 142 MMOL/L (ref 135–145)
WBC # BLD AUTO: 9.7 K/UL (ref 4.8–10.8)

## 2024-04-01 PROCEDURE — 82962 GLUCOSE BLOOD TEST: CPT | Mod: 91

## 2024-04-01 PROCEDURE — 770001 HCHG ROOM/CARE - MED/SURG/GYN PRIV*

## 2024-04-01 PROCEDURE — A9270 NON-COVERED ITEM OR SERVICE: HCPCS

## 2024-04-01 PROCEDURE — 99024 POSTOP FOLLOW-UP VISIT: CPT | Performed by: NURSE PRACTITIONER

## 2024-04-01 PROCEDURE — 700102 HCHG RX REV CODE 250 W/ 637 OVERRIDE(OP): Performed by: NURSE PRACTITIONER

## 2024-04-01 PROCEDURE — 700102 HCHG RX REV CODE 250 W/ 637 OVERRIDE(OP)

## 2024-04-01 PROCEDURE — 700101 HCHG RX REV CODE 250: Performed by: SURGERY

## 2024-04-01 PROCEDURE — A9270 NON-COVERED ITEM OR SERVICE: HCPCS | Performed by: INTERNAL MEDICINE

## 2024-04-01 PROCEDURE — 700102 HCHG RX REV CODE 250 W/ 637 OVERRIDE(OP): Performed by: SURGERY

## 2024-04-01 PROCEDURE — 700111 HCHG RX REV CODE 636 W/ 250 OVERRIDE (IP): Mod: JZ | Performed by: NURSE PRACTITIONER

## 2024-04-01 PROCEDURE — 700102 HCHG RX REV CODE 250 W/ 637 OVERRIDE(OP): Performed by: INTERNAL MEDICINE

## 2024-04-01 PROCEDURE — 80048 BASIC METABOLIC PNL TOTAL CA: CPT

## 2024-04-01 PROCEDURE — 700111 HCHG RX REV CODE 636 W/ 250 OVERRIDE (IP): Mod: JZ | Performed by: INTERNAL MEDICINE

## 2024-04-01 PROCEDURE — A9270 NON-COVERED ITEM OR SERVICE: HCPCS | Performed by: NURSE PRACTITIONER

## 2024-04-01 PROCEDURE — A9270 NON-COVERED ITEM OR SERVICE: HCPCS | Performed by: SURGERY

## 2024-04-01 PROCEDURE — 85027 COMPLETE CBC AUTOMATED: CPT

## 2024-04-01 RX ADMIN — NYSTATIN: 100000 POWDER TOPICAL at 16:57

## 2024-04-01 RX ADMIN — SENNOSIDES AND DOCUSATE SODIUM 2 TABLET: 50; 8.6 TABLET ORAL at 16:56

## 2024-04-01 RX ADMIN — ATENOLOL 25 MG: 25 TABLET ORAL at 04:51

## 2024-04-01 RX ADMIN — INSULIN HUMAN 2 UNITS: 100 INJECTION, SOLUTION PARENTERAL at 23:54

## 2024-04-01 RX ADMIN — BENAZEPRIL HYDROCHLORIDE 40 MG: 20 TABLET ORAL at 04:51

## 2024-04-01 RX ADMIN — POTASSIUM CHLORIDE AND SODIUM CHLORIDE: 450; 150 INJECTION, SOLUTION INTRAVENOUS at 04:50

## 2024-04-01 RX ADMIN — POTASSIUM CHLORIDE AND SODIUM CHLORIDE: 450; 150 INJECTION, SOLUTION INTRAVENOUS at 18:17

## 2024-04-01 RX ADMIN — METOCLOPRAMIDE 5 MG: 5 INJECTION, SOLUTION INTRAMUSCULAR; INTRAVENOUS at 16:57

## 2024-04-01 RX ADMIN — NYSTATIN: 100000 POWDER TOPICAL at 04:53

## 2024-04-01 RX ADMIN — METOCLOPRAMIDE 5 MG: 5 INJECTION, SOLUTION INTRAMUSCULAR; INTRAVENOUS at 23:50

## 2024-04-01 RX ADMIN — INSULIN HUMAN 2 UNITS: 100 INJECTION, SOLUTION PARENTERAL at 11:05

## 2024-04-01 RX ADMIN — ENOXAPARIN SODIUM 40 MG: 100 INJECTION SUBCUTANEOUS at 16:56

## 2024-04-01 RX ADMIN — METOCLOPRAMIDE 5 MG: 5 INJECTION, SOLUTION INTRAMUSCULAR; INTRAVENOUS at 04:50

## 2024-04-01 RX ADMIN — AMLODIPINE BESYLATE 10 MG: 10 TABLET ORAL at 04:51

## 2024-04-01 RX ADMIN — METOCLOPRAMIDE 5 MG: 5 INJECTION, SOLUTION INTRAMUSCULAR; INTRAVENOUS at 10:59

## 2024-04-01 RX ADMIN — LOVASTATIN 40 MG: 20 TABLET ORAL at 04:51

## 2024-04-01 RX ADMIN — INSULIN HUMAN 2 UNITS: 100 INJECTION, SOLUTION PARENTERAL at 17:02

## 2024-04-01 ASSESSMENT — PAIN DESCRIPTION - PAIN TYPE
TYPE: ACUTE PAIN
TYPE: ACUTE PAIN

## 2024-04-01 NOTE — PROGRESS NOTES
Bedside report received.  Assessment complete.  A&O x 4. Patient calls appropriately.  Patient ambulates with Sb assist.    Patient has 0/10 pain. Pain managed with prescribed medications.  Denies N&V. Tolerating sips of clears.  MLI with staples, approximated, CORINNA.  + void, - flatus, - BM.  Patient denies SOB.  SCD's refused.    Review plan with of care with patient. Call light and personal belongings within reach. Hourly rounding in place. All needs met at this time.

## 2024-04-01 NOTE — PROGRESS NOTES
"  DATE: 4/1/2024    Post operative day 7 Exploratory laparotomy, reduction of herniated bowel, closure of vaginal cuff (Dr. Boss), bowel resection and anastomosis.     INTERVAL EVENTS:  Tolerated clears and NGT removed yesterday afternoon. Doing well on liquids, denies pain, no N/V, had a large loose BM this morning.    PHYSICAL EXAMINATION:  Vital Signs: BP (!) 165/74   Pulse 64   Temp 36.8 °C (98.2 °F) (Temporal)   Resp 18   Ht 1.651 m (5' 5\")   Wt 74.2 kg (163 lb 9.3 oz)   SpO2 93%     Abdomen soft, non tender. Midline incision approximated with staples.     LABORATORY VALUES:  Recent Labs     03/30/24  0334 03/31/24  0008 04/01/24  0436   WBC 8.4 9.1 9.7   RBC 3.77* 3.68* 3.72*   HEMOGLOBIN 11.4* 11.2* 11.0*   HEMATOCRIT 34.7* 33.7* 34.3*   MCV 92.0 91.6 92.2   MCH 30.2 30.4 29.6   MCHC 32.9 33.2 32.1*   RDW 44.8 44.8 45.3   PLATELETCT 305 278 262   MPV 10.0 10.2 10.6     Recent Labs     03/31/24  0008 03/31/24  0544 04/01/24  0436   SODIUM 151* 148* 142   POTASSIUM 3.0* 3.5* 3.7   CHLORIDE 106 104 104   CO2 34* 33 26   GLUCOSE 158* 159* 150*   BUN 18 17 16   CREATININE 0.81 0.83 0.81   CALCIUM 7.9* 7.8* 7.7*         ASSESSMENT AND PLAN:  * Prolapse of small intestine- (present on admission)  Assessment & Plan  Hx of hysterectomy 40 years ago.  Had BM and felt bowel come out 3/24 AM.  Seen in [place] and transferred to [place].  Large volume evisceration of small bowel coming through perineum upon arrival.  3/25 Exploratory celiotomy & repair of vaginal cuff dehiscence.  Dr. Boss, gynecology oncology.  [facility].    Ileus following gastrointestinal surgery (HCC)- (present on admission)  Assessment & Plan  3/28 NPO/NGT to LCWS.  3/31 Trial NGT clamping and sips of clears. Tolerated well, NGT removed.  4/1 Fulls.    Hypertension- (present on admission)  Assessment & Plan  Chronic condition treated with atenolol, amlodipine & benazepril.  3/26 Resumed atenolol & amlodipine.  3/29 Resumed " benazepril.    Type 2 diabetes mellitus (HCC)- (present on admission)  Assessment & Plan  Chronic condition treated with metformin.  Holding maintenance metformin.  Insulin sliding scale coverage.  Plan to resumed upon discharge.    Hyperkalemia- (present on admission)  Assessment & Plan  ACE inhibitor held upon admission.  3/25 Resolved.  3/29 Resumed ACE. Monitor.    No contraindication to deep vein thrombosis (DVT) prophylaxis- (present on admission)  Assessment & Plan  3/26 Prophylactic dose enoxaparin 40 mg QD initiated.     Hyperlipidemia- (present on admission)  Assessment & Plan  Chronic condition treated with lovastatin.  3/26 Resumed maintenance medication.    - Fulls for breakfast, if tolerating well may advance to GI soft for lunch.  - Possible DC home next 24-48 hrs.       ____________________________________     QUIN Whipple    DD: 4/1/2024  7:36 AM

## 2024-04-01 NOTE — CARE PLAN
Problem: Knowledge Deficit - Standard  Goal: Patient and family/care givers will demonstrate understanding of plan of care, disease process/condition, diagnostic tests and medications  Description: Target End Date:  1-3 days or as soon as patient condition allows    Document in Patient Education    1.  Patient and family/caregiver oriented to unit, equipment, visitation policy and means for communicating concern  2.  Complete/review Learning Assessment  3.  Assess knowledge level of disease process/condition, treatment plan, diagnostic tests and medications  4.  Explain disease process/condition, treatment plan, diagnostic tests and medications  Outcome: Progressing     Problem: Gastrointestinal Irritability  Goal: Nausea and vomiting will be absent or improve  Description: Target End Date:  Prior to discharge or change in level of care    Document on I/O and Assessment flowsheets    1.  Assess for history, duration, frequency, severity, and potential precipitating factors  2.  Administer antiemetics as ordered  3.  Emesis basin within easy reach of the patient, but out of sight if psychogenic component  4.  Eliminate strong odors from surroundings  5.  Introduce cold water, ice chips, tiffany products, and room temperature broth or bouillon if tolerated and appropriate to the patient's diet  6.  Encourage small amounts of bland, simple foods like broth, rice, bananas, Jell-O, crackers or toast if tolerated and appropriate to patient's diet  7.  Position the patient upright while eating and for 1 to 2 hours post-meal  8.  Encourage nonpharmacological nausea control techniques such as relaxation, guided imagery, music therapy, distraction, or deep breathing exercises  Outcome: Progressing   The patient is Stable - Low risk of patient condition declining or worsening    Shift Goals  Clinical Goals: hydration, rest  Patient Goals: rest  Family Goals: POC updates    Progress made toward(s) clinical / shift goals:  plan  of care discussed with the pt, instructions on use of call bell provided, call bell within reach, pt verbalized understanding.

## 2024-04-01 NOTE — CARE PLAN
The patient is Stable - Low risk of patient condition declining or worsening    Shift Goals  Clinical Goals: oob activity  Patient Goals: rest  Family Goals: POC updates    Progress made toward(s) clinical / shift goals:  Patient ambulating to bathroom and in hallway.    Problem: Knowledge Deficit - Standard  Goal: Patient and family/care givers will demonstrate understanding of plan of care, disease process/condition, diagnostic tests and medications  Description: Target End Date:  1-3 days or as soon as patient condition allows    Document in Patient Education    1.  Patient and family/caregiver oriented to unit, equipment, visitation policy and means for communicating concern  2.  Complete/review Learning Assessment  3.  Assess knowledge level of disease process/condition, treatment plan, diagnostic tests and medications  4.  Explain disease process/condition, treatment plan, diagnostic tests and medications  Outcome: Progressing     Problem: Pain - Standard  Goal: Alleviation of pain or a reduction in pain to the patient’s comfort goal  Description: Target End Date:  Prior to discharge or change in level of care    Document on Vitals flowsheet    1.  Document pain using the appropriate pain scale per order or unit policy  2.  Educate and implement non-pharmacologic comfort measures (i.e. relaxation, distraction, massage, cold/heat therapy, etc.)  3.  Pain management medications as ordered  4.  Reassess pain after pain med administration per policy  5.  If opiods administered assess patient's response to pain medication is appropriate per POSS sedation scale  6.  Follow pain management plan developed in collaboration with patient and interdisciplinary team (including palliative care or pain specialists if applicable)  Outcome: Progressing     Problem: Fall Risk  Goal: Patient will remain free from falls  Description: Target End Date:  Prior to discharge or change in level of care    Document interventions on the  Nilam Harrison Fall Risk Assessment    1.  Assess for fall risk factors  2.  Implement fall precautions  Outcome: Progressing

## 2024-04-01 NOTE — PROGRESS NOTES
Report received from previous shift RN.  Assessment complete.  Patient resting in bed comfortably, even and unlabored breathing present.  Surgical incision to abdomen with staples, CORINNA, CDI.    All needs met at this time. Call light within reach. Hourly rounding in place.

## 2024-04-02 VITALS
WEIGHT: 163.58 LBS | BODY MASS INDEX: 27.25 KG/M2 | DIASTOLIC BLOOD PRESSURE: 70 MMHG | SYSTOLIC BLOOD PRESSURE: 153 MMHG | HEART RATE: 63 BPM | TEMPERATURE: 97.6 F | OXYGEN SATURATION: 94 % | HEIGHT: 65 IN | RESPIRATION RATE: 18 BRPM

## 2024-04-02 PROBLEM — K91.89 ILEUS FOLLOWING GASTROINTESTINAL SURGERY (HCC): Status: RESOLVED | Noted: 2024-03-28 | Resolved: 2024-04-02

## 2024-04-02 PROBLEM — E87.5 HYPERKALEMIA: Status: RESOLVED | Noted: 2024-03-25 | Resolved: 2024-04-02

## 2024-04-02 PROBLEM — Z78.9 NO CONTRAINDICATION TO DEEP VEIN THROMBOSIS (DVT) PROPHYLAXIS: Status: RESOLVED | Noted: 2024-03-26 | Resolved: 2024-04-02

## 2024-04-02 PROBLEM — K63.4: Status: RESOLVED | Noted: 2024-03-25 | Resolved: 2024-04-02

## 2024-04-02 PROBLEM — K56.7 ILEUS FOLLOWING GASTROINTESTINAL SURGERY (HCC): Status: RESOLVED | Noted: 2024-03-28 | Resolved: 2024-04-02

## 2024-04-02 LAB
ANION GAP SERPL CALC-SCNC: 10 MMOL/L (ref 7–16)
BUN SERPL-MCNC: 17 MG/DL (ref 8–22)
CALCIUM SERPL-MCNC: 7.6 MG/DL (ref 8.5–10.5)
CHLORIDE SERPL-SCNC: 105 MMOL/L (ref 96–112)
CO2 SERPL-SCNC: 25 MMOL/L (ref 20–33)
CREAT SERPL-MCNC: 0.84 MG/DL (ref 0.5–1.4)
ERYTHROCYTE [DISTWIDTH] IN BLOOD BY AUTOMATED COUNT: 45.4 FL (ref 35.9–50)
GFR SERPLBLD CREATININE-BSD FMLA CKD-EPI: 72 ML/MIN/1.73 M 2
GLUCOSE BLD STRIP.AUTO-MCNC: 126 MG/DL (ref 65–99)
GLUCOSE BLD STRIP.AUTO-MCNC: 172 MG/DL (ref 65–99)
GLUCOSE SERPL-MCNC: 113 MG/DL (ref 65–99)
HCT VFR BLD AUTO: 34.6 % (ref 37–47)
HGB BLD-MCNC: 10.9 G/DL (ref 12–16)
MCH RBC QN AUTO: 29.7 PG (ref 27–33)
MCHC RBC AUTO-ENTMCNC: 31.5 G/DL (ref 32.2–35.5)
MCV RBC AUTO: 94.3 FL (ref 81.4–97.8)
PLATELET # BLD AUTO: 275 K/UL (ref 164–446)
PMV BLD AUTO: 11.2 FL (ref 9–12.9)
POTASSIUM SERPL-SCNC: 3.4 MMOL/L (ref 3.6–5.5)
RBC # BLD AUTO: 3.67 M/UL (ref 4.2–5.4)
SODIUM SERPL-SCNC: 140 MMOL/L (ref 135–145)
WBC # BLD AUTO: 10.5 K/UL (ref 4.8–10.8)

## 2024-04-02 PROCEDURE — A9270 NON-COVERED ITEM OR SERVICE: HCPCS | Performed by: NURSE PRACTITIONER

## 2024-04-02 PROCEDURE — 99024 POSTOP FOLLOW-UP VISIT: CPT | Performed by: NURSE PRACTITIONER

## 2024-04-02 PROCEDURE — 700101 HCHG RX REV CODE 250: Performed by: SURGERY

## 2024-04-02 PROCEDURE — 82962 GLUCOSE BLOOD TEST: CPT

## 2024-04-02 PROCEDURE — A9270 NON-COVERED ITEM OR SERVICE: HCPCS

## 2024-04-02 PROCEDURE — 700111 HCHG RX REV CODE 636 W/ 250 OVERRIDE (IP): Mod: JZ | Performed by: NURSE PRACTITIONER

## 2024-04-02 PROCEDURE — 80048 BASIC METABOLIC PNL TOTAL CA: CPT

## 2024-04-02 PROCEDURE — 85027 COMPLETE CBC AUTOMATED: CPT

## 2024-04-02 PROCEDURE — 700102 HCHG RX REV CODE 250 W/ 637 OVERRIDE(OP)

## 2024-04-02 PROCEDURE — 700102 HCHG RX REV CODE 250 W/ 637 OVERRIDE(OP): Performed by: NURSE PRACTITIONER

## 2024-04-02 RX ORDER — POTASSIUM CHLORIDE 20 MEQ/1
40 TABLET, EXTENDED RELEASE ORAL ONCE
Status: COMPLETED | OUTPATIENT
Start: 2024-04-02 | End: 2024-04-02

## 2024-04-02 RX ORDER — ACETAMINOPHEN 325 MG/1
650 TABLET ORAL EVERY 6 HOURS PRN
COMMUNITY
Start: 2024-04-02

## 2024-04-02 RX ORDER — ACETAMINOPHEN 325 MG/1
650 TABLET ORAL EVERY 6 HOURS PRN
Status: DISCONTINUED | OUTPATIENT
Start: 2024-04-02 | End: 2024-04-02 | Stop reason: HOSPADM

## 2024-04-02 RX ADMIN — ATENOLOL 25 MG: 25 TABLET ORAL at 04:27

## 2024-04-02 RX ADMIN — LOVASTATIN 40 MG: 20 TABLET ORAL at 04:27

## 2024-04-02 RX ADMIN — METFORMIN HYDROCHLORIDE 1000 MG: 500 TABLET ORAL at 09:51

## 2024-04-02 RX ADMIN — POTASSIUM CHLORIDE AND SODIUM CHLORIDE: 450; 150 INJECTION, SOLUTION INTRAVENOUS at 00:23

## 2024-04-02 RX ADMIN — AMLODIPINE BESYLATE 10 MG: 10 TABLET ORAL at 04:27

## 2024-04-02 RX ADMIN — NYSTATIN: 100000 POWDER TOPICAL at 04:30

## 2024-04-02 RX ADMIN — METOCLOPRAMIDE 5 MG: 5 INJECTION, SOLUTION INTRAMUSCULAR; INTRAVENOUS at 04:27

## 2024-04-02 RX ADMIN — POTASSIUM CHLORIDE 40 MEQ: 1500 TABLET, EXTENDED RELEASE ORAL at 09:52

## 2024-04-02 RX ADMIN — BENAZEPRIL HYDROCHLORIDE 40 MG: 20 TABLET ORAL at 04:27

## 2024-04-02 ASSESSMENT — PAIN DESCRIPTION - PAIN TYPE: TYPE: ACUTE PAIN

## 2024-04-02 NOTE — PROGRESS NOTES
A&Ox4, declines pain intervention, reports abdomen is sore but pain is tolerable.  Midline with staples, CORINNA, CDI, no drainage or redness. Tolerating diet, up to bathroom standby assist.  Sitting up for meals and ambulation encouraged.  Patient verbalized understanding.

## 2024-04-02 NOTE — DISCHARGE INSTRUCTIONS
Post Operative Discharge Instructions:    1. DIET: Upon discharge from the hospital, you may resume your normal preoperative diet, unless specifically directed otherwise. Depending on how you are feeling and whether you have nausea or not, you may wish to stay with a bland diet for the first few days. However, you can advance this as quickly as you feel ready.    2. ACTIVITIES: After discharge from the hospital, you may resume full routine activities; however, there should be no heavy lifting (greater than 20 pounds or a bag of groceries) and no strenuous activities for at least 2 weeks. The duration may be longer, depending on your surgical procedure. Routine activities of daily living are acceptable. Activity level should be addressed at your post-op follow up appointment.    3. DRIVING: You may drive whenever you are off pain medications and are able to perform the activities needed to drive, i.e., turning, bending, twisting, etc.    4. BATHING: You may get the wound wet at any time after leaving the hospital. You may shower, but do not submerge in a bath for at least two weeks.  If you have wound dressings, they may come off after 48 hours.  If you have skin glue to the wound, this will fall off on its own, do not pick at it.  If you have Steri-Strips to the wound, these will fall off on their own, do not pick at them. You may trim the edges if needed.    5. BOWEL FUNCTION:   After surgery, it is not uncommon for patients to develop either frequent or loose stools after meals. This usually corrects itself after a few days, to a few weeks. If this occurs, do not worry; this will resolve on its own.  Constipation is much more common than loose stools. The cause is the combination of pain medication and decreased activity level and possibly the nature of the surgical procedure performed. If you feel this is occurring, you may use an over-the-counter treatment such as MiraLAX (or Milk of Magnesia, Ex-Lax,  Senokot, etc.) until the problem has resolved. Drink plenty of water and try to wean off narcotic pain medications as soon as is comfortable for you.    6. PAIN MEDICATION:   You will be given a prescription for pain medication at discharge. Please take these as directed. It is important to remember not to take medications on an empty stomach as this may cause nausea.  You may also take over the counter acetaminophen and/or NSAIDS (ibuprofen, Aleve, Advil, Motrin) per the package instructions.  You may also use ice to the wound to decrease pain and swelling. You may alternate 20 minutes on and 20 minutes off with the ice for the first 24-48 hours. Make sure you place a washcloth or towel between the ice pack and your skin.  Please note that narcotic pain medication cannot be refilled unless you are seen by a doctor. Make sure you call the office if you are running low on medication or if the dose you have been prescribed is not working well for you.    Staple removal 10-14 Days post-op  [id number]    7.CALL THE [facility] AT [phone] IF YOU HAVE:  (1) Fevers to more than 101F, (2) Unusual chest or leg pain, (3) Drainage or fluid from incision that may be foul smelling, increased tenderness or soreness at the wound or the wound edges are no longer together, redness or swelling at the incision site. Do not hesitate to call with any other questions.

## 2024-04-02 NOTE — DISCHARGE SUMMARY
DISCHARGE  SUMMARY    DATE OF ADMISSION: 3/25/2024    DATE OF DISCHARGE: 4/2/2024    DISCHARGE DIAGNOSIS:  Principal Problem (Resolved):    Prolapse of small intestine  Active Problems:    Type 2 diabetes mellitus (HCC)    Hypertension    Hyperlipidemia  Resolved Problems:    Leukocytosis    PONV (postoperative nausea and vomiting)    Prolapse of intestine    Hyperkalemia    No contraindication to deep vein thrombosis (DVT) prophylaxis    Ileus following gastrointestinal surgery (HCC)      CONSULTATIONS:  Dr. Delio Boss, gynecologic oncology surgery.    PROCEDURES:  Exploratory laparotomy, reduction of herniated bowel, closure of vaginal cuff (Dr. Boss), bowel resection with anastomosis by Dr. Lorrie Harris on 3/25/2024.    BRIEF HPI and HOSPITAL COURSE:  The patient is a 74-year-old female who was bearing down for bowel movement when she experienced a prolapse of her bowel from her vagina.  [facility].  She was taken to the operative suite where Dr. Boss was consulted and she underwent the above listed procedure.  Postoperatively she was admitted to the general surgical pena for standard postoperative care.  She does have a history of hypertension, and diabetes.  Her home medications were resumed as appropriate.  She also had some electrolyte abnormalities which were corrected with fluids and supplementation.  She did develop a postoperative ileus responded well to conservative treatment.  She is currently tolerating room air and a GI soft diet.  Her midline abdominal incision is clean, dry, intact with staples in place.  She does have a small firm nonfluctuant area to the superior to the incision which is nontender.  She is ambulating independently and reporting adequate pain control without any narcotic pain medications.    DISPOSITION:   Discharged home on 4/2/2024. The patient was counseled and  questions were answered. Specifically, signs and symptoms of infection, respiratory decompensation, and persistent or worsening pain were discussed and the patient agrees to seek medical attention if any of these develop.    DISCHARGE MEDICATIONS:     Medication List        START taking these medications        Instructions   acetaminophen 325 MG Tabs  Commonly known as: Tylenol   Take 2 Tablets by mouth every 6 hours as needed for Moderate Pain.  Dose: 650 mg            CONTINUE taking these medications        Instructions   amLODIPine 10 MG Tabs  Commonly known as: Norvasc   Take 10 mg by mouth every day.  Dose: 10 mg     ascorbic acid 500 MG tablet  Commonly known as: Vitamin C   Take 500 mg by mouth every day.  Dose: 500 mg     atenolol 25 MG Tabs  Commonly known as: Tenormin   Take 25 mg by mouth every day.  Dose: 25 mg     benazepril 40 MG tablet  Commonly known as: Lotensin   Take 40 mg by mouth every day.  Dose: 40 mg     ibuprofen 200 MG Tabs  Commonly known as: Motrin   Take 400 mg by mouth every evening.  Dose: 400 mg     lovastatin 40 MG tablet  Commonly known as: Mevacor   Take 40 mg by mouth every day.  Dose: 40 mg     metFORMIN 500 MG Tabs  Commonly known as: Glucophage   Take 1,000 mg by mouth 2 times a day.  Dose: 1,000 mg              ACTIVITY:  After discharge from the hospital, you may resume full routine activities; however, there should be no heavy lifting (greater than 20 pounds or a bag of groceries) and no strenuous activities for at least 2 weeks. The duration may be longer, depending on your surgical procedure. Routine activities of daily living are acceptable. Activity level should be addressed at your post-op follow up appointment. You may drive whenever you are off pain medications and are able to perform the activities needed to drive, i.e., turning, bending, twisting, etc.    WOUND CARE:  You may shower, but do not submerge in a bath for at least two weeks. If you have wound  dressings, they may come off after 48 hours. If you have skin glue to the wound, this will fall off on its own, do not pick at it. If you have steri strips to the wound, these will fall off on their own, do not pick at them, may trim the edges if needed.    DIET:  Upon discharge from the hospital, you may resume your normal preoperative diet, unless specifically directed otherwise. Depending on how you are feeling and whether you have nausea or not, you may wish to stay with a bland diet for the first few days. However, you can advance this as quickly as you feel ready.    FOLLOW UP:  [facility]  [id number]  [place] [address]  [phone]    Schedule an appointment as soon as possible for a visit in 1 week(s)  for wound recheck in the [facility]    Call the office if you have: (1) Fevers to more than 101F, (2) Unusual chest or leg pain, (3) Drainage or fluid from incision that may be foul smelling, increased tenderness or soreness at the wound or the wound edges are no longer together, redness or swelling at the incision site. Do not hesitate to call with any other questions.    TIME SPENT ON DISCHARGE: 35 minutes      ____________________________________________  QUIN Whipple    DD: 4/2/2024 11:58 PM

## 2024-04-02 NOTE — PROGRESS NOTES
"  DATE: 4/2/2024    Post operative day 8 Exploratory laparotomy, reduction of herniated bowel, closure of vaginal cuff (Dr. Boss), bowel resection and anastomosis.     INTERVAL EVENTS:  Feeling great, no pain, tolerating diet.    PHYSICAL EXAMINATION:  Vital Signs: BP (!) 157/70   Pulse (!) 58   Temp 36.7 °C (98 °F) (Temporal)   Resp 18   Ht 1.651 m (5' 5\")   Wt 74.2 kg (163 lb 9.3 oz)   SpO2 93%     Abdomen soft, midline incision c/d/i with staples in place. Upper abdomen with small firm, non fluctuant area.    LABORATORY VALUES:  Recent Labs     03/31/24  0008 04/01/24  0436 04/02/24  0414   WBC 9.1 9.7 10.5   RBC 3.68* 3.72* 3.67*   HEMOGLOBIN 11.2* 11.0* 10.9*   HEMATOCRIT 33.7* 34.3* 34.6*   MCV 91.6 92.2 94.3   MCH 30.4 29.6 29.7   MCHC 33.2 32.1* 31.5*   RDW 44.8 45.3 45.4   PLATELETCT 278 262 275   MPV 10.2 10.6 11.2     Recent Labs     03/31/24  0544 04/01/24  0436 04/02/24 0414   SODIUM 148* 142 140   POTASSIUM 3.5* 3.7 3.4*   CHLORIDE 104 104 105   CO2 33 26 25   GLUCOSE 159* 150* 113*   BUN 17 16 17   CREATININE 0.83 0.81 0.84   CALCIUM 7.8* 7.7* 7.6*                IMAGING:  HE-EBLUBGY-6 VIEW   Final Result      Nasogastric tube tip overlies the first portion of the duodenum. Stable bowel dilation.      DX-ABDOMEN FOR TUBE PLACEMENT   Final Result      NG tube tip overlies the gastric antrum.      DX-CHEST-LIMITED (1 VIEW)   Final Result      1.  NG tube tip located just above the gastroesophageal junction. Advancement into the stomach is recommended.      2.  The patient's nurse was contacted regarding the tube position at 5:02 PM on 3/28/2024.      OF-ODMZCLS-3 VIEW   Final Result      1. Small bowel ileus versus distal mechanical small bowel obstruction. Consider small bowel series for further evaluation.   2. Other chronic degenerative and post surgical changes.      OUTSIDE IMAGES-CT ABDOMEN /PELVIS   Final Result          ASSESSMENT AND PLAN:  * Prolapse of small intestine- (present on " admission)  Assessment & Plan  Hx of hysterectomy 40 years ago.  Had BM and felt bowel come out 3/24 AM.  Seen in [place] and transferred to [place].  Large volume evisceration of small bowel coming through perineum upon arrival.  3/25 Exploratory celiotomy & repair of vaginal cuff dehiscence.  Dr. Boss, gynecology oncology.  [facility].    Hypertension- (present on admission)  Assessment & Plan  Chronic condition treated with atenolol, amlodipine & benazepril.  3/26 Resumed atenolol & amlodipine.  3/29 Resumed benazepril.    Type 2 diabetes mellitus (HCC)- (present on admission)  Assessment & Plan  Chronic condition treated with metformin.  Holding maintenance metformin.  Insulin sliding scale coverage.  4/2 Resumed.    Ileus following gastrointestinal surgery (HCC)- (present on admission)  Assessment & Plan  3/28 NPO/NGT to LCWS.  3/31 Trial NGT clamping and sips of clears. Tolerated well, NGT removed.  4/1 Fulls.  Resolved.    No contraindication to deep vein thrombosis (DVT) prophylaxis- (present on admission)  Assessment & Plan  3/26 Prophylactic dose enoxaparin 40 mg QD initiated.     Hyperlipidemia- (present on admission)  Assessment & Plan  Chronic condition treated with lovastatin.  3/26 Resumed maintenance medication.    Hyperkalemia- (present on admission)  Assessment & Plan  ACE inhibitor held upon admission.  3/25 Resolved.  3/29 Resumed ACE. Monitor.  Resolved.    - Ok for DC home, pt may not have a ride until tomorrow.  - Resume metformin.       ____________________________________     QUIN Whipple    DD: 4/2/2024  9:19 AM

## 2024-04-02 NOTE — CARE PLAN
Problem: Knowledge Deficit - Standard  Goal: Patient and family/care givers will demonstrate understanding of plan of care, disease process/condition, diagnostic tests and medications  Description: Target End Date:  1-3 days or as soon as patient condition allows    Document in Patient Education    1.  Patient and family/caregiver oriented to unit, equipment, visitation policy and means for communicating concern  2.  Complete/review Learning Assessment  3.  Assess knowledge level of disease process/condition, treatment plan, diagnostic tests and medications  4.  Explain disease process/condition, treatment plan, diagnostic tests and medications  Outcome: Progressing   The patient is Stable - Low risk of patient condition declining or worsening    Shift Goals  Clinical Goals: DC in 24-48 hours  Patient Goals: rest  Family Goals: POC updates    Progress made toward(s) clinical / shift goals:  plan of care discussed with the pt, instructions on use of call bell provided, call bell within reach, pt verbalized understanding.

## (undated) DEVICE — CANISTER SUCTION 3000ML MECHANICAL FILTER AUTO SHUTOFF MEDI-VAC NONSTERILE LF DISP  (40EA/CA)

## (undated) DEVICE — SUTURE 0 VICRYL PLUS CTX - 36 INCH (36/BX)

## (undated) DEVICE — SUTURE 0 VICRYL PLUS CT-2 - 27 INCH (36/BX)

## (undated) DEVICE — GLOVE BIOGEL INDICATOR SZ 7.5 SURGICAL PF LTX - (50PR/BX 4BX/CA)

## (undated) DEVICE — SUTURE GENERAL

## (undated) DEVICE — GOWN SURGEONS LARGE - (32/CA)

## (undated) DEVICE — GLOVE COTTON STERILE (50/CA)

## (undated) DEVICE — GOWN SURGICAL X-LARGE ULTRA - FILM-REINFORCED (20/CA)

## (undated) DEVICE — SODIUM CHL IRRIGATION 0.9% 1000ML (12EA/CA)

## (undated) DEVICE — GOWN WARMING STANDARD FLEX - (30/CA)

## (undated) DEVICE — SLEEVE, VASO, THIGH, MED

## (undated) DEVICE — CHLORAPREP 26 ML APPLICATOR - ORANGE TINT(25/CA)

## (undated) DEVICE — Device

## (undated) DEVICE — GLOVE BIOGEL PI ORTHO SZ 7.5 PF LF (40PR/BX)

## (undated) DEVICE — SUTURE 3-0 VICRYL PLUS SH - 27 INCH (36/BX)

## (undated) DEVICE — SUCTION INSTRUMENT YANKAUER BULBOUS TIP W/O VENT (50EA/CA)

## (undated) DEVICE — SET EXTENSION WITH 2 PORTS (48EA/CA) ***PART #2C8610 IS A SUBSTITUTE*****

## (undated) DEVICE — BOVIE  BLADE 6 EXTENDED - (50/PK)

## (undated) DEVICE — ELECTRODE DUAL RETURN W/ CORD - (50/PK)

## (undated) DEVICE — GLOVE BIOGEL SZ 7 SURGICAL PF LTX - (50PR/BX 4BX/CA)

## (undated) DEVICE — DRAPE UNDER BUTTOCKS FLUID - (20/CA)

## (undated) DEVICE — SPONGE GAUZESTER 4 X 4 4PLY - (128PK/CA)

## (undated) DEVICE — SET LEADWIRE 5 LEAD BEDSIDE DISPOSABLE ECG (1SET OF 5/EA)

## (undated) DEVICE — SUTURE 0 LIGATING REEL VICRYL PLUS (12PK/BX)

## (undated) DEVICE — LEGGING LITHOTOMY 31 X 48 IN - (2EA/PK 20PK/CA)

## (undated) DEVICE — PAD LAP STERILE 18 X 18 - (5/PK 40PK/CA)

## (undated) DEVICE — STAPLER SKIN DISP - (6/BX 10BX/CA) VISISTAT

## (undated) DEVICE — DRAPE MAYO STAND - (30/CA)

## (undated) DEVICE — SUTURE 3-0 VICRYL PLUS SH - 8X 18 INCH (12/BX)

## (undated) DEVICE — GLOVE SZ 6 BIOGEL PI MICRO - PF LF (50PR/BX 4BX/CA)

## (undated) DEVICE — GLOVE BIOGEL PI INDICATOR SZ 6.0 SURGICAL PF LF -(200PR/CA)

## (undated) DEVICE — COVER LIGHT HANDLE ALC PLUS DISP (18EA/BX)

## (undated) DEVICE — PACK MAJOR BASIN - (2EA/CA)

## (undated) DEVICE — STAPLER 75MM LINEAR OPEN (3EA/BX)

## (undated) DEVICE — TUBING CLEARLINK DUO-VENT - C-FLO (48EA/CA)

## (undated) DEVICE — GLOVE BIOGEL SZ 6.5 SURGICAL PF LTX (50PR/BX 4BX/CA)

## (undated) DEVICE — SENSOR OXIMETER ADULT SPO2 RD SET (20EA/BX)

## (undated) DEVICE — TOWELS CLOTH SURGICAL - (4/PK 20PK/CA)

## (undated) DEVICE — LACTATED RINGERS INJ 1000 ML - (14EA/CA 60CA/PF)

## (undated) DEVICE — GRAFT MESH SEPRAFILM PRO PACK - 5/BX CONTAINS 6 3X5 PIECES

## (undated) DEVICE — GLOVE BIOGEL PI INDICATOR SZ 6.5 SURGICAL PF LF - (50/BX 4BX/CA)